# Patient Record
Sex: FEMALE | Race: ASIAN
[De-identification: names, ages, dates, MRNs, and addresses within clinical notes are randomized per-mention and may not be internally consistent; named-entity substitution may affect disease eponyms.]

---

## 2017-05-09 ENCOUNTER — APPOINTMENT (OUTPATIENT)
Dept: ANTEPARTUM | Facility: CLINIC | Age: 27
End: 2017-05-09

## 2017-05-30 ENCOUNTER — APPOINTMENT (OUTPATIENT)
Dept: ANTEPARTUM | Facility: CLINIC | Age: 27
End: 2017-05-30

## 2017-06-11 ENCOUNTER — EMERGENCY (EMERGENCY)
Facility: HOSPITAL | Age: 27
LOS: 0 days | Discharge: HOME | End: 2017-06-11

## 2017-06-11 DIAGNOSIS — O99.89 OTHER SPECIFIED DISEASES AND CONDITIONS COMPLICATING PREGNANCY, CHILDBIRTH AND THE PUERPERIUM: ICD-10-CM

## 2017-06-28 DIAGNOSIS — Z91.048 OTHER NONMEDICINAL SUBSTANCE ALLERGY STATUS: ICD-10-CM

## 2017-06-28 DIAGNOSIS — Z3A.14 14 WEEKS GESTATION OF PREGNANCY: ICD-10-CM

## 2017-06-28 DIAGNOSIS — R10.2 PELVIC AND PERINEAL PAIN: ICD-10-CM

## 2017-06-28 DIAGNOSIS — O23.42 UNSPECIFIED INFECTION OF URINARY TRACT IN PREGNANCY, SECOND TRIMESTER: ICD-10-CM

## 2017-06-28 DIAGNOSIS — R10.30 LOWER ABDOMINAL PAIN, UNSPECIFIED: ICD-10-CM

## 2017-06-28 DIAGNOSIS — Z91.013 ALLERGY TO SEAFOOD: ICD-10-CM

## 2017-06-28 DIAGNOSIS — O21.9 VOMITING OF PREGNANCY, UNSPECIFIED: ICD-10-CM

## 2017-07-12 ENCOUNTER — OUTPATIENT (OUTPATIENT)
Dept: ADMINISTRATIVE | Facility: HOSPITAL | Age: 27
LOS: 1 days | Discharge: HOME | End: 2017-07-12

## 2017-07-12 DIAGNOSIS — O99.89 OTHER SPECIFIED DISEASES AND CONDITIONS COMPLICATING PREGNANCY, CHILDBIRTH AND THE PUERPERIUM: ICD-10-CM

## 2017-07-25 ENCOUNTER — APPOINTMENT (OUTPATIENT)
Dept: ANTEPARTUM | Facility: CLINIC | Age: 27
End: 2017-07-25

## 2017-07-25 ENCOUNTER — OUTPATIENT (OUTPATIENT)
Dept: OUTPATIENT SERVICES | Facility: HOSPITAL | Age: 27
LOS: 1 days | Discharge: HOME | End: 2017-07-25

## 2017-07-25 DIAGNOSIS — O99.89 OTHER SPECIFIED DISEASES AND CONDITIONS COMPLICATING PREGNANCY, CHILDBIRTH AND THE PUERPERIUM: ICD-10-CM

## 2017-08-15 DIAGNOSIS — O26.892 OTHER SPECIFIED PREGNANCY RELATED CONDITIONS, SECOND TRIMESTER: ICD-10-CM

## 2017-08-15 DIAGNOSIS — O36.8190 DECREASED FETAL MOVEMENTS, UNSPECIFIED TRIMESTER, NOT APPLICABLE OR UNSPECIFIED: ICD-10-CM

## 2017-09-26 ENCOUNTER — OUTPATIENT (OUTPATIENT)
Dept: OUTPATIENT SERVICES | Facility: HOSPITAL | Age: 27
LOS: 1 days | Discharge: HOME | End: 2017-09-26

## 2017-09-26 DIAGNOSIS — O99.89 OTHER SPECIFIED DISEASES AND CONDITIONS COMPLICATING PREGNANCY, CHILDBIRTH AND THE PUERPERIUM: ICD-10-CM

## 2017-10-12 ENCOUNTER — OUTPATIENT (OUTPATIENT)
Dept: OUTPATIENT SERVICES | Facility: HOSPITAL | Age: 27
LOS: 1 days | Discharge: HOME | End: 2017-10-12

## 2017-10-12 DIAGNOSIS — Z34.90 ENCOUNTER FOR SUPERVISION OF NORMAL PREGNANCY, UNSPECIFIED, UNSPECIFIED TRIMESTER: ICD-10-CM

## 2017-10-12 DIAGNOSIS — O99.89 OTHER SPECIFIED DISEASES AND CONDITIONS COMPLICATING PREGNANCY, CHILDBIRTH AND THE PUERPERIUM: ICD-10-CM

## 2017-10-23 ENCOUNTER — APPOINTMENT (OUTPATIENT)
Dept: OBGYN | Facility: CLINIC | Age: 27
End: 2017-10-23

## 2017-10-23 ENCOUNTER — APPOINTMENT (OUTPATIENT)
Dept: ANTEPARTUM | Facility: CLINIC | Age: 27
End: 2017-10-23

## 2017-10-23 ENCOUNTER — RESULT REVIEW (OUTPATIENT)
Age: 27
End: 2017-10-23

## 2017-10-23 ENCOUNTER — OUTPATIENT (OUTPATIENT)
Dept: OUTPATIENT SERVICES | Facility: HOSPITAL | Age: 27
LOS: 1 days | Discharge: HOME | End: 2017-10-23

## 2017-10-23 VITALS — WEIGHT: 216.3 LBS | DIASTOLIC BLOOD PRESSURE: 62 MMHG | SYSTOLIC BLOOD PRESSURE: 119 MMHG

## 2017-10-23 VITALS — OXYGEN SATURATION: 99 % | HEART RATE: 83 BPM | TEMPERATURE: 97.3 F

## 2017-10-23 VITALS — HEIGHT: 60 IN | BODY MASS INDEX: 42.24 KG/M2

## 2017-10-23 DIAGNOSIS — O99.89 OTHER SPECIFIED DISEASES AND CONDITIONS COMPLICATING PREGNANCY, CHILDBIRTH AND THE PUERPERIUM: ICD-10-CM

## 2017-10-23 DIAGNOSIS — Z78.9 OTHER SPECIFIED HEALTH STATUS: ICD-10-CM

## 2017-10-23 DIAGNOSIS — Z83.3 FAMILY HISTORY OF DIABETES MELLITUS: ICD-10-CM

## 2017-10-23 LAB
BILIRUB UR QL STRIP: NEGATIVE
CLARITY UR: CLEAR
COLLECTION METHOD: NORMAL
GLUCOSE BLDC GLUCOMTR-MCNC: 89
GLUCOSE SERPL-MCNC: 89 MG/DL
GLUCOSE UR-MCNC: NEGATIVE
HCG UR QL: 0.2 EU/DL
HGB UR QL STRIP.AUTO: NEGATIVE
KETONES UR-MCNC: NEGATIVE
LEUKOCYTE ESTERASE UR QL STRIP: NORMAL
NITRITE UR QL STRIP: NEGATIVE
PH UR STRIP: 6.5
PROT UR STRIP-MCNC: NEGATIVE
SP GR UR STRIP: 1.02

## 2017-10-23 RX ORDER — LANCETS 33 GAUGE
EACH MISCELLANEOUS
Qty: 120 | Refills: 5 | Status: ACTIVE | COMMUNITY
Start: 2017-10-23 | End: 1900-01-01

## 2017-10-23 RX ORDER — ISOPROPYL ALCOHOL 70 ML/100ML
SWAB TOPICAL
Qty: 120 | Refills: 5 | Status: ACTIVE | COMMUNITY
Start: 2017-10-23 | End: 1900-01-01

## 2017-10-23 RX ORDER — PRENATAL VIT 49/IRON FUM/FOLIC 6.75-0.2MG
TABLET ORAL
Refills: 0 | Status: ACTIVE | COMMUNITY

## 2017-10-30 ENCOUNTER — APPOINTMENT (OUTPATIENT)
Dept: ANTEPARTUM | Facility: CLINIC | Age: 27
End: 2017-10-30

## 2017-10-30 ENCOUNTER — OUTPATIENT (OUTPATIENT)
Dept: OUTPATIENT SERVICES | Facility: HOSPITAL | Age: 27
LOS: 1 days | Discharge: HOME | End: 2017-10-30

## 2017-10-30 ENCOUNTER — RESULT REVIEW (OUTPATIENT)
Age: 27
End: 2017-10-30

## 2017-10-30 ENCOUNTER — APPOINTMENT (OUTPATIENT)
Dept: OBGYN | Facility: CLINIC | Age: 27
End: 2017-10-30

## 2017-10-30 VITALS
HEART RATE: 84 BPM | BODY MASS INDEX: 42.41 KG/M2 | TEMPERATURE: 97.5 F | HEIGHT: 60 IN | OXYGEN SATURATION: 98 % | DIASTOLIC BLOOD PRESSURE: 70 MMHG | SYSTOLIC BLOOD PRESSURE: 125 MMHG | WEIGHT: 216 LBS

## 2017-10-30 DIAGNOSIS — A53.0 LATENT SYPHILIS, UNSPECIFIED AS EARLY OR LATE: ICD-10-CM

## 2017-10-30 DIAGNOSIS — O99.89 OTHER SPECIFIED DISEASES AND CONDITIONS COMPLICATING PREGNANCY, CHILDBIRTH AND THE PUERPERIUM: ICD-10-CM

## 2017-10-30 LAB
BILIRUB UR QL STRIP: NEGATIVE
CLARITY UR: CLEAR
COLLECTION METHOD: NORMAL
GLUCOSE BLDC GLUCOMTR-MCNC: 84
GLUCOSE SERPL-MCNC: 84 MG/DL
GLUCOSE UR-MCNC: NEGATIVE
HCG UR QL: NORMAL EU/DL
HGB UR QL STRIP.AUTO: NEGATIVE
KETONES UR-MCNC: NEGATIVE
LEUKOCYTE ESTERASE UR QL STRIP: NEGATIVE
NITRITE UR QL STRIP: NEGATIVE
PH UR STRIP: NORMAL
PROT UR STRIP-MCNC: NEGATIVE
SP GR UR STRIP: 1

## 2017-10-30 RX ORDER — BLOOD SUGAR DIAGNOSTIC
STRIP MISCELLANEOUS 4 TIMES DAILY
Qty: 120 | Refills: 10 | Status: ACTIVE | COMMUNITY
Start: 2017-10-30 | End: 1900-01-01

## 2017-10-31 LAB
BASOPHILS # BLD: 0.01 TH/MM3
BASOPHILS NFR BLD: 0.1 %
DIFFERENTIAL METHOD BLD: NORMAL
EOSINOPHIL # BLD: 0.07 TH/MM3
EOSINOPHIL NFR BLD: 0.8 %
ERYTHROCYTE [DISTWIDTH] IN BLOOD BY AUTOMATED COUNT: 13.6 %
ESTIMATED AVERGAGE GLUCOSE (NORTH): 117 MG/DL
GRANULOCYTES # BLD: 6.15 TH/MM3
GRANULOCYTES NFR BLD: 68.3 %
HBA1C MFR BLD: 5.7 %
HCT VFR BLD AUTO: 39.7 %
HGB BLD-MCNC: 12.5 G/DL
IMM GRANULOCYTES # BLD: 0.05 TH/MM3
IMM GRANULOCYTES NFR BLD: 0.6 %
LYMPHOCYTES # BLD: 2.1 TH/MM3
LYMPHOCYTES NFR BLD: 23.4 %
MCH RBC QN AUTO: 28.7 PG
MCHC RBC AUTO-ENTMCNC: 31.5 G/DL
MCV RBC AUTO: 91.3 FL
MONOCYTES # BLD: 0.61 TH/MM3
MONOCYTES NFR BLD: 6.8 %
PLATELET # BLD: 402 TH/MM3
PMV BLD AUTO: 9.7 FL
RBC # BLD AUTO: 4.35 MIL/MM3
RPR SER QL: ABNORMAL
WBC # BLD: 8.99 TH/MM3

## 2017-11-01 LAB
HIV1+2 AB SPEC QL IA.RAPID: NONREACTIVE
T PALLIDUM AB SER QL: NEGATIVE
T PALLIDUM AB SER-ACNC: <0.1 INDEX

## 2017-11-06 ENCOUNTER — RESULT REVIEW (OUTPATIENT)
Age: 27
End: 2017-11-06

## 2017-11-06 ENCOUNTER — APPOINTMENT (OUTPATIENT)
Dept: OBGYN | Facility: CLINIC | Age: 27
End: 2017-11-06

## 2017-11-06 ENCOUNTER — RESULT CHARGE (OUTPATIENT)
Age: 27
End: 2017-11-06

## 2017-11-06 ENCOUNTER — OUTPATIENT (OUTPATIENT)
Dept: OUTPATIENT SERVICES | Facility: HOSPITAL | Age: 27
LOS: 1 days | Discharge: HOME | End: 2017-11-06

## 2017-11-06 ENCOUNTER — APPOINTMENT (OUTPATIENT)
Dept: ANTEPARTUM | Facility: CLINIC | Age: 27
End: 2017-11-06

## 2017-11-06 VITALS — DIASTOLIC BLOOD PRESSURE: 68 MMHG | WEIGHT: 211.2 LBS | BODY MASS INDEX: 41.25 KG/M2 | SYSTOLIC BLOOD PRESSURE: 125 MMHG

## 2017-11-06 VITALS — TEMPERATURE: 97.4 F | HEART RATE: 84 BPM | OXYGEN SATURATION: 97 %

## 2017-11-06 DIAGNOSIS — O99.89 OTHER SPECIFIED DISEASES AND CONDITIONS COMPLICATING PREGNANCY, CHILDBIRTH AND THE PUERPERIUM: ICD-10-CM

## 2017-11-06 LAB
BILIRUB UR QL STRIP: NORMAL
CLARITY UR: CLEAR
COLLECTION METHOD: NORMAL
GLUCOSE BLDC GLUCOMTR-MCNC: 86
GLUCOSE UR-MCNC: NEGATIVE
HCG UR QL: 0.2 EU/DL
HGB UR QL STRIP.AUTO: NEGATIVE
KETONES UR-MCNC: NEGATIVE
LEUKOCYTE ESTERASE UR QL STRIP: NEGATIVE
NITRITE UR QL STRIP: NEGATIVE
PH UR STRIP: 6
PROT UR STRIP-MCNC: NEGATIVE
SP GR UR STRIP: 1.01

## 2017-11-07 LAB — GLUCOSE SERPL-MCNC: 86 MG/DL

## 2017-11-13 ENCOUNTER — APPOINTMENT (OUTPATIENT)
Dept: ANTEPARTUM | Facility: CLINIC | Age: 27
End: 2017-11-13

## 2017-11-13 ENCOUNTER — OUTPATIENT (OUTPATIENT)
Dept: OUTPATIENT SERVICES | Facility: HOSPITAL | Age: 27
LOS: 1 days | Discharge: HOME | End: 2017-11-13

## 2017-11-13 ENCOUNTER — RESULT REVIEW (OUTPATIENT)
Age: 27
End: 2017-11-13

## 2017-11-13 VITALS — DIASTOLIC BLOOD PRESSURE: 68 MMHG | SYSTOLIC BLOOD PRESSURE: 118 MMHG | BODY MASS INDEX: 41.35 KG/M2 | WEIGHT: 211.7 LBS

## 2017-11-13 VITALS — OXYGEN SATURATION: 98 % | HEART RATE: 95 BPM | TEMPERATURE: 98.1 F

## 2017-11-13 DIAGNOSIS — O99.89 OTHER SPECIFIED DISEASES AND CONDITIONS COMPLICATING PREGNANCY, CHILDBIRTH AND THE PUERPERIUM: ICD-10-CM

## 2017-11-13 DIAGNOSIS — O24.415 GESTATIONAL DIABETES MELLITUS IN PREGNANCY, CONTROLLED BY ORAL HYPOGLYCEMIC DRUGS: ICD-10-CM

## 2017-11-13 DIAGNOSIS — Z34.90 ENCOUNTER FOR SUPERVISION OF NORMAL PREGNANCY, UNSPECIFIED, UNSPECIFIED TRIMESTER: ICD-10-CM

## 2017-11-13 DIAGNOSIS — O24.419 GESTATIONAL DIABETES MELLITUS IN PREGNANCY, UNSPECIFIED CONTROL: ICD-10-CM

## 2017-11-13 LAB
BILIRUB UR QL STRIP: NEGATIVE
CLARITY UR: CLEAR
COLLECTION METHOD: NORMAL
GLUCOSE BLDC GLUCOMTR-MCNC: 86
GLUCOSE SERPL-MCNC: 86 MG/DL
GLUCOSE UR-MCNC: NEGATIVE
HCG UR QL: 0.2 EU/DL
HGB UR QL STRIP.AUTO: NORMAL
KETONES UR-MCNC: NEGATIVE
LEUKOCYTE ESTERASE UR QL STRIP: NORMAL
NITRITE UR QL STRIP: NEGATIVE
PH UR STRIP: 6.5
PROT UR STRIP-MCNC: NEGATIVE
SP GR UR STRIP: 1.01

## 2017-11-13 RX ORDER — SODIUM CHLORIDE 0.65 %
0.65 AEROSOL, SPRAY (ML) NASAL TWICE DAILY
Qty: 1 | Refills: 2 | Status: ACTIVE | COMMUNITY
Start: 2017-11-13 | End: 1900-01-01

## 2017-11-14 RX ORDER — PNV NO.95/FERROUS FUM/FOLIC AC 28MG-0.8MG
TABLET ORAL DAILY
Qty: 30 | Refills: 6 | Status: ACTIVE | COMMUNITY
Start: 2017-11-14 | End: 1900-01-01

## 2017-11-16 LAB
C TRACH RRNA SPEC QL NAA+PROBE: NOT DETECTED
GP B STREP DNA SPEC QL NAA+PROBE: NORMAL
N GONORRHOEA RRNA SPEC QL NAA+PROBE: NOT DETECTED
S PYO DNA THROAT QL NAA+PROBE: NOT DETECTED
SPECIMEN SOURCE: NORMAL

## 2017-11-20 ENCOUNTER — RESULT CHARGE (OUTPATIENT)
Age: 27
End: 2017-11-20

## 2017-11-20 ENCOUNTER — APPOINTMENT (OUTPATIENT)
Dept: ANTEPARTUM | Facility: CLINIC | Age: 27
End: 2017-11-20

## 2017-11-20 ENCOUNTER — RESULT REVIEW (OUTPATIENT)
Age: 27
End: 2017-11-20

## 2017-11-20 ENCOUNTER — OUTPATIENT (OUTPATIENT)
Dept: OUTPATIENT SERVICES | Facility: HOSPITAL | Age: 27
LOS: 1 days | Discharge: HOME | End: 2017-11-20

## 2017-11-20 ENCOUNTER — EMERGENCY (EMERGENCY)
Facility: HOSPITAL | Age: 27
LOS: 0 days | Discharge: HOME | End: 2017-11-20

## 2017-11-20 VITALS
SYSTOLIC BLOOD PRESSURE: 124 MMHG | DIASTOLIC BLOOD PRESSURE: 70 MMHG | WEIGHT: 214.38 LBS | BODY MASS INDEX: 41.87 KG/M2

## 2017-11-20 VITALS — HEART RATE: 94 BPM | OXYGEN SATURATION: 99 % | TEMPERATURE: 97.5 F

## 2017-11-20 DIAGNOSIS — Z3A.36 36 WEEKS GESTATION OF PREGNANCY: ICD-10-CM

## 2017-11-20 DIAGNOSIS — O99.89 OTHER SPECIFIED DISEASES AND CONDITIONS COMPLICATING PREGNANCY, CHILDBIRTH AND THE PUERPERIUM: ICD-10-CM

## 2017-11-20 DIAGNOSIS — M79.604 PAIN IN RIGHT LEG: ICD-10-CM

## 2017-11-20 DIAGNOSIS — M25.40 EFFUSION, UNSPECIFIED JOINT: ICD-10-CM

## 2017-11-20 DIAGNOSIS — Z91.013 ALLERGY TO SEAFOOD: ICD-10-CM

## 2017-11-20 DIAGNOSIS — Z91.018 ALLERGY TO OTHER FOODS: ICD-10-CM

## 2017-11-20 LAB
BILIRUB UR QL STRIP: NEGATIVE
CLARITY UR: CLEAR
COLLECTION METHOD: NORMAL
GLUCOSE BLDC GLUCOMTR-MCNC: 99
GLUCOSE SERPL-MCNC: 99 MG/DL
GLUCOSE UR-MCNC: NEGATIVE
HCG UR QL: 0.2 EU/DL
HGB UR QL STRIP.AUTO: NEGATIVE
KETONES UR-MCNC: NEGATIVE
LEUKOCYTE ESTERASE UR QL STRIP: NEGATIVE
NITRITE UR QL STRIP: NEGATIVE
PH UR STRIP: 6
PROT UR STRIP-MCNC: NEGATIVE
SP GR UR STRIP: 1.01

## 2017-11-20 RX ORDER — GLYBURIDE 2.5 MG/1
2.5 TABLET ORAL DAILY
Qty: 30 | Refills: 5 | Status: ACTIVE | COMMUNITY
Start: 2017-10-23 | End: 1900-01-01

## 2017-11-25 ENCOUNTER — INPATIENT (INPATIENT)
Facility: HOSPITAL | Age: 27
LOS: 0 days | Discharge: HOME | End: 2017-11-26
Attending: OBSTETRICS & GYNECOLOGY | Admitting: OBSTETRICS & GYNECOLOGY

## 2017-11-25 DIAGNOSIS — O99.89 OTHER SPECIFIED DISEASES AND CONDITIONS COMPLICATING PREGNANCY, CHILDBIRTH AND THE PUERPERIUM: ICD-10-CM

## 2017-11-27 ENCOUNTER — APPOINTMENT (OUTPATIENT)
Dept: ANTEPARTUM | Facility: CLINIC | Age: 27
End: 2017-11-27

## 2017-11-29 DIAGNOSIS — K42.9 UMBILICAL HERNIA WITHOUT OBSTRUCTION OR GANGRENE: ICD-10-CM

## 2017-11-29 DIAGNOSIS — Z82.49 FAMILY HISTORY OF ISCHEMIC HEART DISEASE AND OTHER DISEASES OF THE CIRCULATORY SYSTEM: ICD-10-CM

## 2017-11-29 DIAGNOSIS — E66.9 OBESITY, UNSPECIFIED: ICD-10-CM

## 2017-11-29 DIAGNOSIS — Z33.1 PREGNANT STATE, INCIDENTAL: ICD-10-CM

## 2017-11-29 DIAGNOSIS — Z3A.37 37 WEEKS GESTATION OF PREGNANCY: ICD-10-CM

## 2017-11-29 DIAGNOSIS — Z83.3 FAMILY HISTORY OF DIABETES MELLITUS: ICD-10-CM

## 2017-12-02 ENCOUNTER — EMERGENCY (EMERGENCY)
Facility: HOSPITAL | Age: 27
LOS: 0 days | Discharge: HOME | End: 2017-12-02

## 2017-12-02 DIAGNOSIS — O99.89 OTHER SPECIFIED DISEASES AND CONDITIONS COMPLICATING PREGNANCY, CHILDBIRTH AND THE PUERPERIUM: ICD-10-CM

## 2017-12-02 DIAGNOSIS — R10.2 PELVIC AND PERINEAL PAIN: ICD-10-CM

## 2017-12-02 DIAGNOSIS — Z91.030 BEE ALLERGY STATUS: ICD-10-CM

## 2017-12-02 DIAGNOSIS — Z91.013 ALLERGY TO SEAFOOD: ICD-10-CM

## 2017-12-02 DIAGNOSIS — R05 COUGH: ICD-10-CM

## 2017-12-02 DIAGNOSIS — J06.9 ACUTE UPPER RESPIRATORY INFECTION, UNSPECIFIED: ICD-10-CM

## 2017-12-04 ENCOUNTER — APPOINTMENT (OUTPATIENT)
Dept: ANTEPARTUM | Facility: CLINIC | Age: 27
End: 2017-12-04

## 2017-12-11 ENCOUNTER — APPOINTMENT (OUTPATIENT)
Dept: ANTEPARTUM | Facility: CLINIC | Age: 27
End: 2017-12-11

## 2018-02-06 LAB — FETAL MOVEMENT: PRESENT

## 2018-07-25 PROBLEM — Z78.9 ALCOHOL USE: Status: INACTIVE | Noted: 2017-10-23

## 2019-02-03 ENCOUNTER — EMERGENCY (EMERGENCY)
Facility: HOSPITAL | Age: 29
LOS: 0 days | Discharge: HOME | End: 2019-02-03
Attending: EMERGENCY MEDICINE

## 2019-02-03 VITALS
RESPIRATION RATE: 18 BRPM | TEMPERATURE: 99 F | DIASTOLIC BLOOD PRESSURE: 68 MMHG | OXYGEN SATURATION: 96 % | HEART RATE: 91 BPM | SYSTOLIC BLOOD PRESSURE: 117 MMHG

## 2019-02-03 VITALS
HEART RATE: 112 BPM | RESPIRATION RATE: 18 BRPM | DIASTOLIC BLOOD PRESSURE: 77 MMHG | TEMPERATURE: 101 F | SYSTOLIC BLOOD PRESSURE: 133 MMHG | OXYGEN SATURATION: 99 %

## 2019-02-03 DIAGNOSIS — R50.9 FEVER, UNSPECIFIED: ICD-10-CM

## 2019-02-03 DIAGNOSIS — R07.0 PAIN IN THROAT: ICD-10-CM

## 2019-02-03 DIAGNOSIS — R05 COUGH: ICD-10-CM

## 2019-02-03 LAB
ALBUMIN SERPL ELPH-MCNC: 4.7 G/DL — SIGNIFICANT CHANGE UP (ref 3.5–5.2)
ALP SERPL-CCNC: 85 U/L — SIGNIFICANT CHANGE UP (ref 30–115)
ALT FLD-CCNC: 29 U/L — SIGNIFICANT CHANGE UP (ref 0–41)
ANION GAP SERPL CALC-SCNC: 16 MMOL/L — HIGH (ref 7–14)
APPEARANCE UR: CLEAR — SIGNIFICANT CHANGE UP
AST SERPL-CCNC: 23 U/L — SIGNIFICANT CHANGE UP (ref 0–41)
BASOPHILS # BLD AUTO: 0.02 K/UL — SIGNIFICANT CHANGE UP (ref 0–0.2)
BASOPHILS NFR BLD AUTO: 0.4 % — SIGNIFICANT CHANGE UP (ref 0–1)
BILIRUB SERPL-MCNC: 0.2 MG/DL — SIGNIFICANT CHANGE UP (ref 0.2–1.2)
BILIRUB UR-MCNC: NEGATIVE — SIGNIFICANT CHANGE UP
BUN SERPL-MCNC: 7 MG/DL — LOW (ref 10–20)
CALCIUM SERPL-MCNC: 8.8 MG/DL — SIGNIFICANT CHANGE UP (ref 8.5–10.1)
CHLORIDE SERPL-SCNC: 98 MMOL/L — SIGNIFICANT CHANGE UP (ref 98–110)
CO2 SERPL-SCNC: 24 MMOL/L — SIGNIFICANT CHANGE UP (ref 17–32)
COLOR SPEC: YELLOW — SIGNIFICANT CHANGE UP
CREAT SERPL-MCNC: 0.8 MG/DL — SIGNIFICANT CHANGE UP (ref 0.7–1.5)
DIFF PNL FLD: NEGATIVE — SIGNIFICANT CHANGE UP
EOSINOPHIL # BLD AUTO: 0.02 K/UL — SIGNIFICANT CHANGE UP (ref 0–0.7)
EOSINOPHIL NFR BLD AUTO: 0.4 % — SIGNIFICANT CHANGE UP (ref 0–8)
GLUCOSE SERPL-MCNC: 68 MG/DL — LOW (ref 70–99)
GLUCOSE UR QL: NEGATIVE MG/DL — SIGNIFICANT CHANGE UP
HCG SERPL QL: NEGATIVE — SIGNIFICANT CHANGE UP
HCT VFR BLD CALC: 39.3 % — SIGNIFICANT CHANGE UP (ref 37–47)
HGB BLD-MCNC: 12.8 G/DL — SIGNIFICANT CHANGE UP (ref 12–16)
IMM GRANULOCYTES NFR BLD AUTO: 0.4 % — HIGH (ref 0.1–0.3)
KETONES UR-MCNC: NEGATIVE — SIGNIFICANT CHANGE UP
LACTATE SERPL-SCNC: 1 MMOL/L — SIGNIFICANT CHANGE UP (ref 0.5–2.2)
LEUKOCYTE ESTERASE UR-ACNC: NEGATIVE — SIGNIFICANT CHANGE UP
LYMPHOCYTES # BLD AUTO: 1.96 K/UL — SIGNIFICANT CHANGE UP (ref 1.2–3.4)
LYMPHOCYTES # BLD AUTO: 39 % — SIGNIFICANT CHANGE UP (ref 20.5–51.1)
MCHC RBC-ENTMCNC: 28.5 PG — SIGNIFICANT CHANGE UP (ref 27–31)
MCHC RBC-ENTMCNC: 32.6 G/DL — SIGNIFICANT CHANGE UP (ref 32–37)
MCV RBC AUTO: 87.5 FL — SIGNIFICANT CHANGE UP (ref 81–99)
MONOCYTES # BLD AUTO: 0.58 K/UL — SIGNIFICANT CHANGE UP (ref 0.1–0.6)
MONOCYTES NFR BLD AUTO: 11.6 % — HIGH (ref 1.7–9.3)
NEUTROPHILS # BLD AUTO: 2.42 K/UL — SIGNIFICANT CHANGE UP (ref 1.4–6.5)
NEUTROPHILS NFR BLD AUTO: 48.2 % — SIGNIFICANT CHANGE UP (ref 42.2–75.2)
NITRITE UR-MCNC: NEGATIVE — SIGNIFICANT CHANGE UP
NRBC # BLD: 0 /100 WBCS — SIGNIFICANT CHANGE UP (ref 0–0)
PH UR: 7 — SIGNIFICANT CHANGE UP (ref 5–8)
PLATELET # BLD AUTO: 297 K/UL — SIGNIFICANT CHANGE UP (ref 130–400)
POTASSIUM SERPL-MCNC: 4.3 MMOL/L — SIGNIFICANT CHANGE UP (ref 3.5–5)
POTASSIUM SERPL-SCNC: 4.3 MMOL/L — SIGNIFICANT CHANGE UP (ref 3.5–5)
PROT SERPL-MCNC: 7.4 G/DL — SIGNIFICANT CHANGE UP (ref 6–8)
PROT UR-MCNC: NEGATIVE MG/DL — SIGNIFICANT CHANGE UP
RBC # BLD: 4.49 M/UL — SIGNIFICANT CHANGE UP (ref 4.2–5.4)
RBC # FLD: 12.1 % — SIGNIFICANT CHANGE UP (ref 11.5–14.5)
SODIUM SERPL-SCNC: 138 MMOL/L — SIGNIFICANT CHANGE UP (ref 135–146)
SP GR SPEC: <=1.005 — SIGNIFICANT CHANGE UP (ref 1.01–1.03)
UROBILINOGEN FLD QL: 0.2 MG/DL — SIGNIFICANT CHANGE UP (ref 0.2–0.2)
WBC # BLD: 5.02 K/UL — SIGNIFICANT CHANGE UP (ref 4.8–10.8)
WBC # FLD AUTO: 5.02 K/UL — SIGNIFICANT CHANGE UP (ref 4.8–10.8)

## 2019-02-03 RX ORDER — KETOROLAC TROMETHAMINE 30 MG/ML
30 SYRINGE (ML) INJECTION ONCE
Qty: 0 | Refills: 0 | Status: DISCONTINUED | OUTPATIENT
Start: 2019-02-03 | End: 2019-02-03

## 2019-02-03 RX ORDER — DEXAMETHASONE 0.5 MG/5ML
10 ELIXIR ORAL ONCE
Qty: 0 | Refills: 0 | Status: COMPLETED | OUTPATIENT
Start: 2019-02-03 | End: 2019-02-03

## 2019-02-03 RX ORDER — SODIUM CHLORIDE 9 MG/ML
2000 INJECTION, SOLUTION INTRAVENOUS ONCE
Qty: 0 | Refills: 0 | Status: COMPLETED | OUTPATIENT
Start: 2019-02-03 | End: 2019-02-03

## 2019-02-03 RX ADMIN — Medication 30 MILLIGRAM(S): at 12:01

## 2019-02-03 RX ADMIN — Medication 30 MILLIGRAM(S): at 13:19

## 2019-02-03 RX ADMIN — SODIUM CHLORIDE 2000 MILLILITER(S): 9 INJECTION, SOLUTION INTRAVENOUS at 13:17

## 2019-02-03 RX ADMIN — Medication 10 MILLIGRAM(S): at 12:01

## 2019-02-03 NOTE — ED PROVIDER NOTE - MEDICAL DECISION MAKING DETAILS
fever, cough, throat pain - labs/cxr/CT soft tissue neck nl - ivf & anti[yretics given, pt felt better prior to dischsrge, return precautions discussed, encouraged outpt PMD f/u

## 2019-02-03 NOTE — ED PROVIDER NOTE - PHYSICAL EXAMINATION
VITAL SIGNS: I have reviewed nursing notes and confirm.  CONSTITUTIONAL: Well-developed; well-nourished; in no acute distress.  SKIN: Skin exam is warm and dry, no acute rash.  HEAD: Normocephalic; atraumatic.  EYES: PERRL, EOM intact; +conjunctival injection bl, no sclera clear.  ENT: No nasal discharge; op clear, pharynx nl, no tonsillar erythema or edema, no exudates, uvula midline, no stridor/drooling.  NECK: Supple; non tender. No lad.  CARD: S1, S2 normal; no murmurs, gallops, or rubs. Regular rate and rhythm.  RESP: No wheezes, rales or rhonchi.  ABD: Normal bowel sounds; soft; non-distended; non-tender; no hepatosplenomegaly.  BACK: non-tender, no CVAT  EXT: Normal ROM. No clubbing, cyanosis or edema. DPI.  NEURO: Alert, oriented. Grossly unremarkable. No focal deficits.  PSYCH: Cooperative, appropriate.

## 2019-02-03 NOTE — ED ADULT NURSE NOTE - NSIMPLEMENTINTERV_GEN_ALL_ED
Implemented All Fall with Harm Risk Interventions:  [place] to call system. Call bell, personal items and telephone within reach. Instruct patient to call for assistance. Room bathroom lighting operational. Non-slip footwear when patient is off stretcher. Physically safe environment: no spills, clutter or unnecessary equipment. Stretcher in lowest position, wheels locked, appropriate side rails in place. Provide visual cue, wrist band, yellow gown, etc. Monitor gait and stability. Monitor for mental status changes and reorient to person, place, and time. Review medications for side effects contributing to fall risk. Reinforce activity limits and safety measures with patient and family. Provide visual clues: red socks.

## 2019-02-03 NOTE — ED ADULT NURSE REASSESSMENT NOTE - NS ED NURSE REASSESS COMMENT FT1
Pt reassessed A/O times 4 Vs stable report filling slight better ambulate steady lunch tray provide did eat 100% denies nausea or vomiting on going nursing observation .

## 2019-02-03 NOTE — ED PROVIDER NOTE - OBJECTIVE STATEMENT
28y f no pmh p/w fever & cough x 1 week. Also rpts "throat inf" appx 2 mos ago, was given an abx but doesn't remember name. States she now has throat pain again, feels like "something's stuck". Denies any diff swallowing or breathing. No cp/sob, nvd, flank pain, urinary sx, rash. +Sick contact,  w/similar sx. No recent travel.

## 2019-02-03 NOTE — ED PROVIDER NOTE - NS ED ROS FT
Constitutional: see hpi  Eyes:  No visual changes, eye pain or discharge.  ENMT:  see hpi  Cardiac:  No chest pain, SOB or edema. No chest pain with exertion.  Respiratory:  +cough or respiratory distress. No hemoptysis. No history of asthma or RAD.  GI:  No nausea, vomiting, diarrhea or abdominal pain.  :  No dysuria, frequency or burning.  MS:  No myalgia, muscle weakness, joint pain or back pain.  Neuro:  No headache or weakness.  No LOC.  Skin:  No skin rash.   Endocrine: No history of thyroid disease or diabetes.

## 2019-02-03 NOTE — ED PROVIDER NOTE - NSFOLLOWUPINSTRUCTIONS_ED_ALL_ED_FT
Viral Respiratory Infection    A viral respiratory infection is an illness that affects parts of the body used for breathing, like the lungs, nose, and throat. It is caused by a germ called a virus. Symptoms can include runny nose, coughing, sneezing, fatigue, body aches, sore throat, fever, or headache. Over the counter medicine can be used to manage the symptoms but the infection itself goes away on its own.     SEEK IMMEDIATE MEDICAL CARE IF YOU HAVE THE FOLLOWING SYMPTOMS: shortness of breath, chest pain, fever over 10 days, lightheadedness/dizziness.

## 2019-02-04 LAB
CULTURE RESULTS: SIGNIFICANT CHANGE UP
SPECIMEN SOURCE: SIGNIFICANT CHANGE UP

## 2019-04-18 ENCOUNTER — APPOINTMENT (OUTPATIENT)
Dept: ENDOCRINOLOGY | Facility: CLINIC | Age: 29
End: 2019-04-18

## 2020-01-12 ENCOUNTER — EMERGENCY (EMERGENCY)
Facility: HOSPITAL | Age: 30
LOS: 0 days | Discharge: HOME | End: 2020-01-12
Admitting: EMERGENCY MEDICINE
Payer: MEDICAID

## 2020-01-12 VITALS
RESPIRATION RATE: 20 BRPM | TEMPERATURE: 99 F | DIASTOLIC BLOOD PRESSURE: 60 MMHG | OXYGEN SATURATION: 95 % | SYSTOLIC BLOOD PRESSURE: 122 MMHG | HEART RATE: 87 BPM

## 2020-01-12 DIAGNOSIS — H57.89 OTHER SPECIFIED DISORDERS OF EYE AND ADNEXA: ICD-10-CM

## 2020-01-12 PROCEDURE — 99283 EMERGENCY DEPT VISIT LOW MDM: CPT

## 2020-01-12 RX ORDER — MOXIFLOXACIN HCL 0.5 %
1 DROPS OPHTHALMIC (EYE)
Qty: 3 | Refills: 0
Start: 2020-01-12 | End: 2020-01-18

## 2020-01-12 RX ORDER — OLOPATADINE HYDROCHLORIDE 1 MG/ML
1 SOLUTION/ DROPS OPHTHALMIC
Qty: 5 | Refills: 0
Start: 2020-01-12 | End: 2020-01-18

## 2020-01-12 RX ORDER — KETOTIFEN FUMARATE 0.34 MG/ML
1 SOLUTION OPHTHALMIC ONCE
Refills: 0 | Status: DISCONTINUED | OUTPATIENT
Start: 2020-01-12 | End: 2020-01-12

## 2020-01-12 RX ORDER — LORATADINE 10 MG/1
1 TABLET ORAL
Qty: 14 | Refills: 0
Start: 2020-01-12 | End: 2020-01-25

## 2020-01-12 NOTE — ED PROVIDER NOTE - NSFOLLOWUPINSTRUCTIONS_ED_ALL_ED_FT
CONJUNCTIVITIS - AfterCare(R) Instructions(ER/ED)     Conjunctivitis    WHAT YOU NEED TO KNOW:    Conjunctivitis, or pink eye, is inflammation of your conjunctiva. The conjunctiva is a thin tissue that covers the front of your eye and the back of your eyelids. The conjunctiva helps protect your eye and keep it moist. Conjunctivitis may be caused by bacteria, allergies, or a virus. If your conjunctivitis is caused by bacteria, it may get better on its own in about 7 days. Viral conjunctivitis can last up to 3 weeks.     DISCHARGE INSTRUCTIONS:    Return to the emergency department if:     You have worsening eye pain.       The swelling in your eye gets worse, even after treatment.       Your vision suddenly becomes worse or you cannot see at all.    Contact your healthcare provider if:     You develop a fever and ear pain.      You have tiny bumps or spots of blood on your eye.      You have questions or concerns about your condition or care.    Manage your symptoms:     Apply a cool compress. Wet a washcloth with cold water and place it on your eye. This will help decrease itching and irritation.      Do not wear contact lenses. They can irritate your eye. Throw away the pair you are using and ask when you can wear them again. Use a new pair of lenses when your healthcare provider says it is okay.       Avoid irritants. Stay away from smoke filled areas. Shield your eyes from wind and sun.       Flush your eye. You may need to flush your eye with saline to help decrease your symptoms. Ask for more information on how to flush your eye.     Medicines: Treatment depends on what is causing your conjunctivitis. You maybe given any of the following:    Allergy medicine helps decrease itchy, red, swollen eyes caused by allergies. It may be given as a pill, eye drops, or nasal spray.      Antibiotics may be needed if your conjunctivitis is caused by bacteria. This medicine may be given as a pill, eye drops, or eye ointment.      Take your medicine as directed. Contact your healthcare provider if you think your medicine is not helping or if you have side effects. Tell him or her if you are allergic to any medicine. Keep a list of the medicines, vitamins, and herbs you take. Include the amounts, and when and why you take them. Bring the list or the pill bottles to follow-up visits. Carry your medicine list with you in case of an emergency.    Prevent the spread of conjunctivitis:     Wash your hands with soap and water often. Wash your hands before and after you touch your eyes. Also wash your hands before you prepare or eat food and after you use the bathroom or change a diaper.      Avoid allergens. Try to avoid the things that cause your allergies, such as pets, dust, or grass.       Avoid contact with others. Do not share towels or washcloths. Try to stay away from others as much as possible. Ask when you can return to work or school.       Throw away eye makeup. The bacteria that caused your conjunctivitis can stay in eye makeup. Throw away mascara and other eye makeup.         © Copyright [employer] 2020       back to top         Follow up with your primary medical doctor in 1-2 days  Follow up with eye clinic tomorrow.

## 2020-01-12 NOTE — ED PROVIDER NOTE - OBJECTIVE STATEMENT
29 y.o female w/ hx of hypothyroid presents to the ED for evaluation of L eye redness x 1 week. For past week erythema and itchiness.  Went to PMD 5 days ago, prescribed polymixin drops.  Minimal relief with drops prompting visit to the ED.  Denies eye pain, changes in vision, crusting of eyelids, ear pain, fever, chills. No further complaints.  No known trauma or injury.

## 2020-01-12 NOTE — ED ADULT TRIAGE NOTE - CHIEF COMPLAINT QUOTE
Patient c/o bilateral eye redness x 1 week. Was started on eye drops by PMD but symptoms have not improved.

## 2020-01-12 NOTE — ED PROVIDER NOTE - CLINICAL SUMMARY MEDICAL DECISION MAKING FREE TEXT BOX
erythematous eye, likely allergic, meds prescribed.  f/u with eye clinic tomorrow. I have discussed the discharge plan with the patient. The patient agrees with the plan, as discussed.  The patient understands Emergency Department diagnosis is a preliminary diagnosis often based on limited information and that the patient must adhere to the follow-up plan as discussed.  The patient understands that if the symptoms worsen or if prescribed medications do not have the desired/planned effect that the patient may return to the Emergency Department at any time for further evaluation and treatment.

## 2020-01-12 NOTE — ED PROVIDER NOTE - PHYSICAL EXAMINATION
CONST: Well appearing in NAD  EYES: PERRL, EOMI, conjunctival injection R eye, R eye tearing, no fluorescin uptake, IOP 16 bilaterally, visual acuity 20/20 bilaterally   SKIN: Warm, dry, no acute rashes. Good turgor

## 2020-01-12 NOTE — ED PROVIDER NOTE - PROGRESS NOTE DETAILS
Discussed results with pt.  All questions were answered and return precautions discussed.  Pt is asx and comfortable at this time.  Unremarkable re-exam.  No further concerns at this time from pt.  Will follow up with PMD and eye clinic.  Pt understands and agrees with tx plan.

## 2020-01-12 NOTE — ED PROVIDER NOTE - NS ED ROS FT
CONST: No fever, chills or bodyaches  EYES: + eye redness. No pain,  drainage or visual changes.  ENT: No ear pain or discharge, nasal discharge or congestion. No sore throat  CARD: No chest pain, palpitations  RESP: No SOB, cough,   GI: No abdominal pain, N/V/D  MS: No joint pain, back pain or extremity pain/injury  SKIN: No rashes  NEURO: No headache, dizziness, paresthesias

## 2020-01-12 NOTE — ED PROVIDER NOTE - PATIENT PORTAL LINK FT
You can access the FollowMyHealth Patient Portal offered by  by registering at the following website: [email or web address]. By joining [employer]’s FollowMyHealth portal, you will also be able to view your health information using other applications (apps) compatible with our system.

## 2021-04-06 ENCOUNTER — OUTPATIENT (OUTPATIENT)
Dept: OUTPATIENT SERVICES | Facility: HOSPITAL | Age: 31
LOS: 1 days | Discharge: HOME | End: 2021-04-06

## 2021-04-06 DIAGNOSIS — Z12.31 ENCOUNTER FOR SCREENING MAMMOGRAM FOR MALIGNANT NEOPLASM OF BREAST: ICD-10-CM

## 2021-04-06 PROBLEM — E03.9 HYPOTHYROIDISM, UNSPECIFIED: Chronic | Status: ACTIVE | Noted: 2020-01-12

## 2021-12-13 NOTE — ED PROVIDER NOTE - NSFOLLOWUPCLINICS_GEN_ALL_ED_FT
[facility]  Ophthalmolgy  [address], [address]  [place] [address]  Phone: [phone]  Fax:   Follow Up Time: 1-3 Days Not applicable

## 2022-04-06 ENCOUNTER — EMERGENCY (EMERGENCY)
Facility: HOSPITAL | Age: 32
LOS: 0 days | Discharge: HOME | End: 2022-04-06
Attending: EMERGENCY MEDICINE | Admitting: EMERGENCY MEDICINE
Payer: MEDICAID

## 2022-04-06 VITALS
OXYGEN SATURATION: 99 % | HEART RATE: 81 BPM | HEIGHT: 60 IN | TEMPERATURE: 96 F | RESPIRATION RATE: 18 BRPM | DIASTOLIC BLOOD PRESSURE: 74 MMHG | SYSTOLIC BLOOD PRESSURE: 119 MMHG

## 2022-04-06 VITALS
DIASTOLIC BLOOD PRESSURE: 77 MMHG | HEIGHT: 60 IN | HEART RATE: 84 BPM | SYSTOLIC BLOOD PRESSURE: 132 MMHG | TEMPERATURE: 98 F | OXYGEN SATURATION: 98 % | RESPIRATION RATE: 20 BRPM | WEIGHT: 190.04 LBS

## 2022-04-06 DIAGNOSIS — E03.9 HYPOTHYROIDISM, UNSPECIFIED: ICD-10-CM

## 2022-04-06 DIAGNOSIS — L29.2 PRURITUS VULVAE: ICD-10-CM

## 2022-04-06 DIAGNOSIS — B37.3 CANDIDIASIS OF VULVA AND VAGINA: ICD-10-CM

## 2022-04-06 DIAGNOSIS — R30.0 DYSURIA: ICD-10-CM

## 2022-04-06 LAB
APPEARANCE UR: ABNORMAL
BACTERIA # UR AUTO: ABNORMAL
BILIRUB UR-MCNC: NEGATIVE — SIGNIFICANT CHANGE UP
COLOR SPEC: SIGNIFICANT CHANGE UP
DIFF PNL FLD: NEGATIVE — SIGNIFICANT CHANGE UP
EPI CELLS # UR: 5 /HPF — SIGNIFICANT CHANGE UP (ref 0–5)
GLUCOSE UR QL: NEGATIVE — SIGNIFICANT CHANGE UP
HYALINE CASTS # UR AUTO: 3 /LPF — SIGNIFICANT CHANGE UP (ref 0–7)
KETONES UR-MCNC: NEGATIVE — SIGNIFICANT CHANGE UP
LEUKOCYTE ESTERASE UR-ACNC: ABNORMAL
NITRITE UR-MCNC: NEGATIVE — SIGNIFICANT CHANGE UP
PH UR: 6.5 — SIGNIFICANT CHANGE UP (ref 5–8)
PROT UR-MCNC: SIGNIFICANT CHANGE UP
RBC CASTS # UR COMP ASSIST: 0 /HPF — SIGNIFICANT CHANGE UP (ref 0–4)
SP GR SPEC: 1.01 — SIGNIFICANT CHANGE UP (ref 1.01–1.03)
UROBILINOGEN FLD QL: SIGNIFICANT CHANGE UP
WBC UR QL: 23 /HPF — HIGH (ref 0–5)

## 2022-04-06 PROCEDURE — 99283 EMERGENCY DEPT VISIT LOW MDM: CPT

## 2022-04-06 PROCEDURE — 99284 EMERGENCY DEPT VISIT MOD MDM: CPT

## 2022-04-06 RX ORDER — FLUCONAZOLE 150 MG/1
1 TABLET ORAL
Qty: 1 | Refills: 0
Start: 2022-04-06 | End: 2022-04-06

## 2022-04-06 RX ORDER — FLUCONAZOLE 150 MG/1
150 TABLET ORAL ONCE
Refills: 0 | Status: COMPLETED | OUTPATIENT
Start: 2022-04-06 | End: 2022-04-06

## 2022-04-06 RX ORDER — MICONAZOLE NITRATE 2 %
1 CREAM (GRAM) TOPICAL
Qty: 7 | Refills: 0
Start: 2022-04-06 | End: 2022-04-12

## 2022-04-06 RX ADMIN — FLUCONAZOLE 150 MILLIGRAM(S): 150 TABLET ORAL at 10:35

## 2022-04-06 NOTE — ED PROVIDER NOTE - NSFOLLOWUPCLINICS_GEN_ALL_ED_FT
[facility]  OB/GYN  [address] [place] [address]  Phone: [phone]  Fax:

## 2022-04-06 NOTE — ED PROVIDER NOTE - NSFOLLOWUPINSTRUCTIONS_ED_ALL_ED_FT
Vaginitis  ImageVaginitis is a condition in which the vaginal tissue swells and becomes red (inflamed). This condition is most often caused by a change in the normal balance of bacteria and yeast that live in the vagina. This change causes an overgrowth of certain bacteria or yeast, which causes the inflammation. There are different types of vaginitis, but the most common types are:    Bacterial vaginosis.   Yeast infection (candidiasis).  Trichomoniasis vaginitis. This is a sexually transmitted disease (STD).  Viral vaginitis.  Atrophic vaginitis.  Allergic vaginitis.    What are the causes?  The cause of this condition depends on the type of vaginitis. It can be caused by:    Bacteria (bacterial vaginosis).  Yeast, which is a fungus (yeast infection).  A parasite (trichomoniasis vaginitis).  A virus (viral vaginitis).  Low hormone levels (atrophic vaginitis). Low hormone levels can occur during pregnancy, breastfeeding, or after menopause.  Irritants, such as bubble baths, scented tampons, and feminine sprays (allergic vaginitis).    Other factors can change the normal balance of the yeast and bacteria that live in the vagina. These include:    Antibiotic medicines.  Poor hygiene.  Diaphragms, vaginal sponges, spermicides, birth control pills, and intrauterine devices (IUD).  Sex.  Infection.  Uncontrolled diabetes.  A weakened defense (immune) system.    What increases the risk?  This condition is more likely to develop in women who:    Smoke.  Use vaginal douches, scented tampons, or scented sanitary pads.  Wear tight-fitting pants.  Wear thong underwear.  Use oral birth control pills or an IUD.  Have sex without a condom.  Have multiple sex partners.  Have an STD.  Frequently use the spermicide nonoxynol-9.  Eat lots of foods high in sugar.  Have uncontrolled diabetes.  Have low estrogen levels.  Have a weakened immune system from an immune disorder or medical treatment.  Are pregnant or breastfeeding.    What are the signs or symptoms?  Symptoms vary depending on the cause of the vaginitis. Common symptoms include:    Abnormal vaginal discharge.    The discharge is white, gray, or yellow with bacterial vaginosis.  The discharge is thick, white, and cheesy with a yeast infection.  The discharge is frothy and yellow or greenish with trichomoniasis.    A bad vaginal smell. The smell is fishy with bacterial vaginosis.  Vaginal itching, pain, or swelling.  Sex that is painful.  Pain or burning when urinating.    Sometimes there are no symptoms.    How is this diagnosed?  This condition is diagnosed based on your symptoms and medical history. A physical exam, including a pelvic exam, will also be done. You may also have other tests, including:    Tests to determine the pH level (acidity or alkalinity) of your vagina.  A whiff test, to assess the odor that results when a sample of your vaginal discharge is mixed with a potassium hydroxide solution.  Tests of vaginal fluid. A sample will be examined under a microscope.    How is this treated?  Treatment varies depending on the type of vaginitis you have. Your treatment may include:    Antibiotic creams or pills to treat bacterial vaginosis and trichomoniasis.  Antifungal medicines, such as vaginal creams or suppositories, to treat a yeast infection.  Medicine to ease discomfort if you have viral vaginitis. Your sexual partner should also be treated.  Estrogen delivered in a cream, pill, suppository, or vaginal ring to treat atrophic vaginitis. If vaginal dryness occurs, lubricants and moisturizing creams may help. You may need to avoid scented soaps, sprays, or douches.  Stopping use of a product that is causing allergic vaginitis. Then using a vaginal cream to treat the symptoms.    Follow these instructions at home:  Lifestyle     Keep your genital area clean and dry. Avoid soap, and only rinse the area with water.  Do not douche or use tampons until your health care provider says it is okay to do so. Use sanitary pads, if needed.  Do not have sex until your health care provider approves. When you can return to sex, practice safe sex and use condoms.  Wipe from front to back. This avoids the spread of bacteria from the rectum to the vagina.  General instructions     Take over-the-counter and prescription medicines only as told by your health care provider.  If you were prescribed an antibiotic medicine, take or use it as told by your health care provider. Do not stop taking or using the antibiotic even if you start to feel better.  Keep all follow-up visits as told by your health care provider. This is important.  How is this prevented?  Use mild, non-scented products. Do not use things that can irritate the vagina, such as fabric softeners. Avoid the following products if they are scented:    Feminine sprays.  Detergents.  Tampons.  Feminine hygiene products.  Soaps or bubble baths.    Let air reach your genital area.    Wear cotton underwear to reduce moisture buildup.  Avoid wearing underwear while you sleep.  Avoid wearing tight pants and underwear or nylons without a cotton panel.  Avoid wearing thong underwear.    Take off any wet clothing, such as bathing suits, as soon as possible.  Practice safe sex and use condoms.  Contact a health care provider if:  You have abdominal pain.  You have a fever.  You have symptoms that last for more than 2–3 days.  Get help right away if:  You have a fever and your symptoms suddenly get worse.  Summary  Vaginitis is a condition in which the vaginal tissue becomes inflamed.This condition is most often caused by a change in the normal balance of bacteria and yeast that live in the vagina.  Treatment varies depending on the type of vaginitis you have.  Do not douche, use tampons , or have sex until your health care provider approves. When you can return to sex, practice safe sex and use condoms.

## 2022-04-06 NOTE — ED PROVIDER NOTE - OBJECTIVE STATEMENT
pt seen earlier for yeast vaginitis, but did not take meds until pta 2/2 fasting. no relief after taking them, came for re-eval

## 2022-04-06 NOTE — ED PROVIDER NOTE - CLINICAL SUMMARY MEDICAL DECISION MAKING FREE TEXT BOX
31-year-old female with history of hypothyroidism, nondiabetic, presents to the ED for assessment of vaginal burning and discharge.  The patient was seen earlier and diagnosed with vulvovaginal candidiasis, she was prescribed fluconazole and was already using topical Monistat.  Patient notes that she did not take the fluconazole on discharge because she has been fasting.  She took it this evening notes that her burning and itching is worse.  No dysuria, hematuria, abdominal pain, fever, chills.    Vital signs normal.  The patient is well-appearing, in no distress.  She has clear lungs, regular rate and rhythm, soft, nontender, nondistended abdomen.  She has a moderate amount of yeastlike discharge adherent to the labia minora and inside the vaginal vault.  No discharge.  She has no other lesions.    Patient is being optimally treated.  She had a urinalysis earlier today which was slightly contaminated showing few WBC but no bacteria.  Urine culture is pending, will hold off on starting antibiotics as this will likely worsen her current candidiasis and UA is equivocal, culture was sent.  UA showed no glucose.    Advised on using cool compresses, GYN follow-up.    The patient notes that she has had recurrent candidiasis but has not discussed this with her GYN.  I advised on hygiene, changing the kind of soap she uses, cotton undergarments, need to follow-up with GYN to figure out why she is having recurrent infections.

## 2022-04-06 NOTE — ED ADULT TRIAGE NOTE - CHIEF COMPLAINT QUOTE
I was here this morning, theys say I have a fungal infection, I took the medicine 30 or 40 minutes ago because I was fasting all day, now its really burning and itching too much - patient

## 2022-04-06 NOTE — ED PROVIDER NOTE - NS ED ATTENDING STATEMENT MOD
This was a shared visit with the CHITRA. I reviewed and verified the documentation and independently performed the documented:

## 2022-04-06 NOTE — ED PROVIDER NOTE - PROGRESS NOTE DETAILS
ua noted from earlier today, will hold abx 2/2 yeast vaginitis. Pt voices understanding of use of medications, instructions for care, and reasons to return or to go to ED. Pt not concerned for STDs at this time

## 2022-04-06 NOTE — ED PROVIDER NOTE - CLINICAL SUMMARY MEDICAL DECISION MAKING FREE TEXT BOX
Pt with vaginal burning and itching, with yeast infection on exam.  will start antifungal, f/u with gyn outpt

## 2022-04-06 NOTE — ED PROVIDER NOTE - OBJECTIVE STATEMENT
32 yo f with pmh of hypothyroidism, presents with c/o vaginal itching and burning.  also with dysuria.  no hematuria.  no vaginal bleeding or discharge.  denies fever or chills.  pt says LMP 3/6, she is concerned she is pregnant.  gyn is on [facility].  no abd pain, no n/v/d.

## 2022-04-06 NOTE — ED ADULT NURSE NOTE - NSIMPLEMENTINTERV_GEN_ALL_ED
Implemented All Universal Safety Interventions:  [place] to call system. Call bell, personal items and telephone within reach. Instruct patient to call for assistance. Room bathroom lighting operational. Non-slip footwear when patient is off stretcher. Physically safe environment: no spills, clutter or unnecessary equipment. Stretcher in lowest position, wheels locked, appropriate side rails in place.

## 2022-04-06 NOTE — ED PROVIDER NOTE - NS ED ROS FT
Review of Systems:  	•	CONSTITUTIONAL - no fever, no diaphoresis, no weight change  	•	SKIN - no rash  	•	GI - no abd pain, no nausea, no vomiting, no diarrhea, no constipation, no bleeding  	•	 - + dysuria, no hematuria, no flank pain, no urinary retention, +vaginal itching  	•	MUSCULOSKELETAL - no joint paint, no swelling, no redness  All other systems negative, unless specified in HPI

## 2022-04-06 NOTE — ED ADULT NURSE NOTE - CHIEF COMPLAINT
The patient is a 48y Female complaining of fall. The patient is a 31y Female complaining of vaginal itching.

## 2022-04-06 NOTE — ED PROVIDER NOTE - PATIENT PORTAL LINK FT
You can access the FollowMyHealth Patient Portal offered by [facility] by registering at the following website: [email or web address]. By joining [employer]s FollowMyHealth portal, you will also be able to view your health information using other applications (apps) compatible with our system.

## 2022-04-06 NOTE — ED PROVIDER NOTE - PHYSICAL EXAMINATION
VITAL SIGNS: I have reviewed nursing notes and confirm.  CONSTITUTIONAL: Well-developed; well-nourished; in no acute distress.  SKIN: Skin exam is warm and dry, no acute rash.  HEAD: Normocephalic; atraumatic.  EYES: PERRL, EOM intact; conjunctiva and sclera clear.  ENT: No nasal discharge; airway clear. TMs clear.  NECK: Supple; non tender.  CARD: S1, S2 normal; no murmurs, gallops, or rubs. Regular rate and rhythm.  RESP: No wheezes, rales or rhonchi.  ABD: Normal bowel sounds; soft; non-distended; non-tender;  PELVIC:    PSYCH: Cooperative, appropriate. VITAL SIGNS: I have reviewed nursing notes and confirm.  CONSTITUTIONAL: Well-developed; well-nourished; in no acute distress.  SKIN: Skin exam is warm and dry, no acute rash.  HEAD: Normocephalic; atraumatic.  EYES: PERRL, EOM intact; conjunctiva and sclera clear.  ENT: No nasal discharge; airway clear. TMs clear.  NECK: Supple; non tender.  CARD: S1, S2 normal; no murmurs, gallops, or rubs. Regular rate and rhythm.  RESP: No wheezes, rales or rhonchi.  ABD: Normal bowel sounds; soft; non-distended; non-tender;  PELVIC:  +white clumpy vaginal discharge, os closed, no cmt  PSYCH: Cooperative, appropriate.

## 2022-04-06 NOTE — ED PROVIDER NOTE - PATIENT PORTAL LINK FT
You can access the FollowMyHealth Patient Portal offered by [facility] by registering at the following website: [email or web address]. By joining "[employer]’s FollowMyHealth portal, you will also be able to view your health information using other applications (apps) compatible with our system.

## 2022-04-06 NOTE — ED PROVIDER NOTE - PHYSICAL EXAMINATION
CONSTITUTIONAL: Well-appearing; well-nourished; in no apparent distress.   GI/: exam deferred as had pelvic hours ago at first ED visit; non-distended; non-tender; no palpable organomegaly. .   SKIN: Normal for age and race; warm; dry; good turgor; no apparent lesions or exudate.   NEURO/PSYCH: A & O x 4; grossly unremarkable. mood and manner are appropriate.

## 2022-04-08 LAB
CULTURE RESULTS: SIGNIFICANT CHANGE UP
SPECIMEN SOURCE: SIGNIFICANT CHANGE UP

## 2022-04-28 ENCOUNTER — APPOINTMENT (OUTPATIENT)
Dept: ANTEPARTUM | Facility: CLINIC | Age: 32
End: 2022-04-28
Payer: MEDICAID

## 2022-04-28 ENCOUNTER — ASOB RESULT (OUTPATIENT)
Age: 32
End: 2022-04-28

## 2022-04-28 ENCOUNTER — OUTPATIENT (OUTPATIENT)
Dept: OUTPATIENT SERVICES | Facility: HOSPITAL | Age: 32
LOS: 1 days | Discharge: HOME | End: 2022-04-28

## 2022-04-28 PROCEDURE — 76817 TRANSVAGINAL US OBSTETRIC: CPT | Mod: 26,59

## 2022-04-29 DIAGNOSIS — O30.041 TWIN PREGNANCY, DICHORIONIC/DIAMNIOTIC, FIRST TRIMESTER: ICD-10-CM

## 2022-04-29 DIAGNOSIS — Z3A.01 LESS THAN 8 WEEKS GESTATION OF PREGNANCY: ICD-10-CM

## 2022-04-29 DIAGNOSIS — O36.80X0 PREGNANCY WITH INCONCLUSIVE FETAL VIABILITY, NOT APPLICABLE OR UNSPECIFIED: ICD-10-CM

## 2022-05-13 ENCOUNTER — APPOINTMENT (OUTPATIENT)
Dept: ANTEPARTUM | Facility: CLINIC | Age: 32
End: 2022-05-13
Payer: MEDICAID

## 2022-05-13 ENCOUNTER — OUTPATIENT (OUTPATIENT)
Dept: OUTPATIENT SERVICES | Facility: HOSPITAL | Age: 32
LOS: 1 days | Discharge: HOME | End: 2022-05-13

## 2022-05-13 ENCOUNTER — ASOB RESULT (OUTPATIENT)
Age: 32
End: 2022-05-13

## 2022-05-13 PROCEDURE — 76817 TRANSVAGINAL US OBSTETRIC: CPT | Mod: 26

## 2022-05-13 PROCEDURE — 99212 OFFICE O/P EST SF 10 MIN: CPT | Mod: 25

## 2022-05-13 PROCEDURE — 76801 OB US < 14 WKS SINGLE FETUS: CPT | Mod: 26

## 2022-06-13 ENCOUNTER — OUTPATIENT (OUTPATIENT)
Dept: OUTPATIENT SERVICES | Facility: HOSPITAL | Age: 32
LOS: 1 days | Discharge: HOME | End: 2022-06-13

## 2022-06-13 ENCOUNTER — ASOB RESULT (OUTPATIENT)
Age: 32
End: 2022-06-13

## 2022-06-13 ENCOUNTER — APPOINTMENT (OUTPATIENT)
Dept: ANTEPARTUM | Facility: CLINIC | Age: 32
End: 2022-06-13
Payer: MEDICAID

## 2022-06-13 ENCOUNTER — LABORATORY RESULT (OUTPATIENT)
Age: 32
End: 2022-06-13

## 2022-06-13 PROCEDURE — 76813 OB US NUCHAL MEAS 1 GEST: CPT | Mod: 26

## 2022-07-22 ENCOUNTER — APPOINTMENT (OUTPATIENT)
Dept: ANTEPARTUM | Facility: CLINIC | Age: 32
End: 2022-07-22

## 2022-07-22 ENCOUNTER — OUTPATIENT (OUTPATIENT)
Dept: OUTPATIENT SERVICES | Facility: HOSPITAL | Age: 32
LOS: 1 days | Discharge: HOME | End: 2022-07-22

## 2022-07-22 ENCOUNTER — ASOB RESULT (OUTPATIENT)
Age: 32
End: 2022-07-22

## 2022-07-22 PROCEDURE — 76817 TRANSVAGINAL US OBSTETRIC: CPT | Mod: 26

## 2022-07-22 PROCEDURE — 76811 OB US DETAILED SNGL FETUS: CPT | Mod: 26

## 2022-07-28 DIAGNOSIS — Z36.3 ENCOUNTER FOR ANTENATAL SCREENING FOR MALFORMATIONS: ICD-10-CM

## 2022-07-28 DIAGNOSIS — Z3A.18 18 WEEKS GESTATION OF PREGNANCY: ICD-10-CM

## 2022-07-28 DIAGNOSIS — O31.10X0 CONTINUING PREGNANCY AFTER SPONTANEOUS ABORTION OF ONE FETUS OR MORE, UNSPECIFIED TRIMESTER, NOT APPLICABLE OR UNSPECIFIED: ICD-10-CM

## 2022-07-28 DIAGNOSIS — O35.9XX0 MATERNAL CARE FOR (SUSPECTED) FETAL ABNORMALITY AND DAMAGE, UNSPECIFIED, NOT APPLICABLE OR UNSPECIFIED: ICD-10-CM

## 2022-07-28 DIAGNOSIS — O99.212 OBESITY COMPLICATING PREGNANCY, SECOND TRIMESTER: ICD-10-CM

## 2022-08-12 ENCOUNTER — APPOINTMENT (OUTPATIENT)
Dept: ANTEPARTUM | Facility: CLINIC | Age: 32
End: 2022-08-12

## 2022-08-12 ENCOUNTER — ASOB RESULT (OUTPATIENT)
Age: 32
End: 2022-08-12

## 2022-08-12 ENCOUNTER — OUTPATIENT (OUTPATIENT)
Dept: OUTPATIENT SERVICES | Facility: HOSPITAL | Age: 32
LOS: 1 days | Discharge: HOME | End: 2022-08-12

## 2022-08-12 PROCEDURE — 76815 OB US LIMITED FETUS(S): CPT | Mod: 26

## 2022-08-17 DIAGNOSIS — O31.10X0 CONTINUING PREGNANCY AFTER SPONTANEOUS ABORTION OF ONE FETUS OR MORE, UNSPECIFIED TRIMESTER, NOT APPLICABLE OR UNSPECIFIED: ICD-10-CM

## 2022-08-17 DIAGNOSIS — Z3A.21 21 WEEKS GESTATION OF PREGNANCY: ICD-10-CM

## 2022-08-17 DIAGNOSIS — O35.9XX0 MATERNAL CARE FOR (SUSPECTED) FETAL ABNORMALITY AND DAMAGE, UNSPECIFIED, NOT APPLICABLE OR UNSPECIFIED: ICD-10-CM

## 2022-08-17 DIAGNOSIS — O99.212 OBESITY COMPLICATING PREGNANCY, SECOND TRIMESTER: ICD-10-CM

## 2022-10-31 ENCOUNTER — APPOINTMENT (OUTPATIENT)
Dept: ANTEPARTUM | Facility: CLINIC | Age: 32
End: 2022-10-31

## 2022-11-15 ENCOUNTER — APPOINTMENT (OUTPATIENT)
Dept: ANTEPARTUM | Facility: CLINIC | Age: 32
End: 2022-11-15

## 2022-11-15 ENCOUNTER — ASOB RESULT (OUTPATIENT)
Age: 32
End: 2022-11-15

## 2022-11-15 ENCOUNTER — OUTPATIENT (OUTPATIENT)
Dept: OUTPATIENT SERVICES | Facility: HOSPITAL | Age: 32
LOS: 1 days | Discharge: HOME | End: 2022-11-15

## 2022-11-15 PROCEDURE — 76816 OB US FOLLOW-UP PER FETUS: CPT | Mod: 26

## 2022-11-15 PROCEDURE — 76818 FETAL BIOPHYS PROFILE W/NST: CPT | Mod: 26,59

## 2022-11-21 ENCOUNTER — OUTPATIENT (OUTPATIENT)
Dept: OUTPATIENT SERVICES | Facility: HOSPITAL | Age: 32
LOS: 1 days | Discharge: HOME | End: 2022-11-21

## 2022-11-21 ENCOUNTER — ASOB RESULT (OUTPATIENT)
Age: 32
End: 2022-11-21

## 2022-11-21 ENCOUNTER — APPOINTMENT (OUTPATIENT)
Dept: ANTEPARTUM | Facility: CLINIC | Age: 32
End: 2022-11-21

## 2022-11-21 ENCOUNTER — RESULT CHARGE (OUTPATIENT)
Age: 32
End: 2022-11-21

## 2022-11-21 VITALS
BODY MASS INDEX: 43.75 KG/M2 | OXYGEN SATURATION: 99 % | HEART RATE: 100 BPM | WEIGHT: 224 LBS | TEMPERATURE: 98.2 F | DIASTOLIC BLOOD PRESSURE: 75 MMHG | SYSTOLIC BLOOD PRESSURE: 124 MMHG

## 2022-11-21 LAB
BILIRUB UR QL STRIP: NORMAL
BP DIAS: 75 MM HG
BP SYS: 124 MM HG
CLARITY UR: CLEAR
COLLECTION METHOD: NORMAL
FETAL HEART RATE (BPM): 140
FETAL MOVEMENT: PRESENT
GLUCOSE BLDC GLUCOMTR-MCNC: 94
GLUCOSE BLDC GLUCOMTR-MCNC: 94 MG/DL — SIGNIFICANT CHANGE UP (ref 70–99)
GLUCOSE UR-MCNC: NORMAL
HCG UR QL: 0.2 EU/DL
HGB UR QL STRIP.AUTO: NORMAL
KETONES UR-MCNC: NORMAL
LEUKOCYTE ESTERASE UR QL STRIP: NORMAL
NITRITE UR QL STRIP: NORMAL
OB COMMENTS: NORMAL
PH UR STRIP: 7
PROT UR STRIP-MCNC: NORMAL
SCHEDULED VISIT: YES
SP GR UR STRIP: 1.02
URINE ALBUMIN/PROTEIN: NORMAL
URINE GLUCOSE: NORMAL
URINE KETONES: NORMAL
WEEKS GESTATION: 35.3

## 2022-11-21 PROCEDURE — 99215 OFFICE O/P EST HI 40 MIN: CPT | Mod: 25

## 2022-11-21 PROCEDURE — 76818 FETAL BIOPHYS PROFILE W/NST: CPT | Mod: 26

## 2022-11-21 PROCEDURE — ZZZZZ: CPT

## 2022-11-21 NOTE — DISCUSSION/SUMMARY
[FreeTextEntry1] : Ms. Brandt is a 32 year old  @ 35 weeks 3 days referred by Dr. Hamlin for initial consultation.    She has gestational diabetes and is on metformin 500 mg PO BID, insulin was offered by Dr. Hamlin and was declined.  She has hypothyroidism and is on levothyroxine 100 mcg PO daily.  This pregnancy was initially a twin pregnancy with a vanishing twin diagnosed in the first trimester.  \par \par She reports decreased fetal movement even last week, this week she feels her fetus the same as last week.\par \par Past medical history:\par Gestational diabetes, on Metformin 500 mg PO BID\par Levothyroxine 100 mcg PO daily\par Aspirin 81 mg PO daily\par \par Past surgical history: none\par \par Allergies: No known drug allergies.  Allergy to shellfish\par \par Medications:\par Metformin 500 mg PO BID\par Levothyroxine 100 mcg PO daily\par Aspirin 81 mg PO daily.\par \par Obstetric history:\par 2016 39 weeks 4 days 7 pounds vaginal delivery female [facility] gestational hypertension \par 2017 37 weeks 4 days 6 pounds vaginal delivery female [facility] gestational diabetes on glyburide\par \par GYN history:\par No fibroids, no abnormal pap smears, no infection\par \par Family History:  Mother with diabetes and hypertension.  Father with hypertension\par \par Social History:  No alcohol use, drug use, or smoking.\par \par Review of systems:  No fevers, chills, problems seeing, problems hearing, chest pain, short of breath, nausea, vomiting, dysuria.  her mood is happy.\par \par Prenatal labs\par One hour \par AFP negative\par NIPS low risk\par Hgb 12.3 Hct 37.3 --> 10.7/32.3\par CMP within normal limits (creatinine was 0.9 on )\par CF negative for mutations analyzed. \par Hemoglobin electrophoresis AA\par HCV NR\par HepBSAg NR\par HIV NR\par Measles immune\par Mumps immune\par Rubella Immune\par Quantiferon negative\par RPR 1:1, Treponema antibody negative\par 2022 TSH 0.948.  Total T4 11.4\par Free t4 0.57 (22) 0.58 (2022)\par Varicella immune\par Ms. Brandt is a 32 year old  @ 35 weeks 3 days referred by Dr. Hamlin for gestational diabetes and hypothyroidism.  This pregnancy started out as a twin pregnancy.  \par   \par General: In no apparent distress.\par HEENT:  Atraumatic.  Extraocular muscles intact. \par Cardiovascular: Regular rate and rhythm, normal S1/S2\par Pulmonary: Clear to auscultation bilaterally.  No wheezing.\par Abdomen: soft, gravid, nontender, nondistended, no rebound, no guarding\par Extremities: no calf tenderness or edema\par Neurology: +2 reflexes in bilateral upper extremities\par Psychiatry:  Happy mood.  Awake , alert, oriented to person, place, time.\par \par \par Counseling: \par \par 1.  The patient declines evaluation by a nutritionist.\par \par 2.  She is familiar with capillary blood glucose testing (fasting and 2 hours after each meal).\par \par 3.  The significance and usual management of diabetes in pregnancy were reviewed, including risks of preeclampsia, Intra-Uterine Fetal Demise, macrosomia, birth trauma, pre-term labor,  section, NICU admission (the main reasons include  hypoglycemia and  jaundice) and the possible need for insulin therapy.\par \par 4.  Exercise was encouraged.  Ideally, she should walk briskly  after each meal.\par \par Recommendations:\par \par 1.  Glycemic Control.  Target glucose ranges to minimize excessive fetal growth are:  Fasting 60-90 mg/dl; preprandial  mg/dl; and 2-hour postprandial < 120 mg/dl.  If these values are elevated, insulin therapy is encouraged.\par \par 2.  Antepartum testing.  Daily fetal movement counts are recommended from 28 weeks.  Weekly or twice weekly biophysical profiles may be recommended, the frequency and timing will depend on glucose control.  Ultrasound assessment of fetal growth and macrosomic features is recommended.\par \par 3.  Timing of Delivery.  Ms. Brandt desires delivery at around 37 weeks gestation.  She understands that increased risk of NICU admission and respiratory issues the earlier a baby is delivered.   Given that she is on metformin, requiring an increase in her dose, and has decreased fetal movement I think this is reasonable. \par \par 4.  Route of delivery.  Diabetes is associated with about a six-fold risk for shoulder dystocia (which includes the risk of irreversible nerve, bone, and brain injury).  Operative vaginal deliveries should be approached with caution if at all.\par \par 5.  Glucose control in labor.  During labor, capillary glucose values should be checked every few hours (depending on their stability).  Insulin may be required to cover elevated glucose levels.\par \par 6.  Long-term diabetes surveillance.  The risk of developing diabetes outside of pregnancy over the next five years may be as high as 50%.  I recommend that she have a 2 hour GTT or similar diabetes screen  at 6 to 12 weeks postpartum with her primary Obstetrician and counseling about ways to minimize her risk.  If her fasting glucose is normal, annual glucose screening by her primary care physician is advised.\par \par \par Gestational diabetes\par - On Metformin 500 mg PO BID\par - 22 - 22 fasting values 101-110\par    2 hour postprandial values  ( elevated)\par - We will keep the Metformin to 500 mg in the morning and increase to 1000 mg at bedtime.\par \par Hypothyroidism\par - On levothyroxine 100 mcg \par - TSH  was 0.95.  Please repeat and add Free T4\par \par Prenatal labs\par - One hour GCT was 211\par - RPR 1:1 on 10/3.  Treponema antibody was negative.\par \par Right fetal renal pelvis dilation 7.7 mm, not discussed with the patient today. \par \par Prenatal care is with Dr. Hamlin.  From what I understand cultures and 3rd trimester labs will be obtained today.\par Hypoglycemia, fetal movement and labor precautions discussed\par D/c aspirin \par For possible induction of labor at 37 weeks gestation.\par Twice weekly biophysical profiles with non stress tests\par Follow up in around one week with the fingerstick log.\par \par Discussed with Dr. Hamlin by phone.\par \par Ms. Brandt expressed understanding and all of her questions were answered to her satisfaction.  Thank you for this consult.\par \par Sunny Porter MD

## 2022-11-22 DIAGNOSIS — O24.415 GESTATIONAL DIABETES MELLITUS IN PREGNANCY, CONTROLLED BY ORAL HYPOGLYCEMIC DRUGS: ICD-10-CM

## 2022-11-22 DIAGNOSIS — Z3A.35 35 WEEKS GESTATION OF PREGNANCY: ICD-10-CM

## 2022-11-22 DIAGNOSIS — O99.283 ENDOCRINE, NUTRITIONAL AND METABOLIC DISEASES COMPLICATING PREGNANCY, THIRD TRIMESTER: ICD-10-CM

## 2022-11-22 DIAGNOSIS — O99.213 OBESITY COMPLICATING PREGNANCY, THIRD TRIMESTER: ICD-10-CM

## 2022-11-25 ENCOUNTER — APPOINTMENT (OUTPATIENT)
Dept: ANTEPARTUM | Facility: CLINIC | Age: 32
End: 2022-11-25

## 2022-11-25 ENCOUNTER — ASOB RESULT (OUTPATIENT)
Age: 32
End: 2022-11-25

## 2022-11-25 ENCOUNTER — OUTPATIENT (OUTPATIENT)
Dept: OUTPATIENT SERVICES | Facility: HOSPITAL | Age: 32
LOS: 1 days | Discharge: HOME | End: 2022-11-25

## 2022-11-25 PROCEDURE — ZZZZZ: CPT

## 2022-11-25 PROCEDURE — 76818 FETAL BIOPHYS PROFILE W/NST: CPT | Mod: 26

## 2022-11-28 ENCOUNTER — RESULT CHARGE (OUTPATIENT)
Age: 32
End: 2022-11-28

## 2022-11-28 ENCOUNTER — INPATIENT (INPATIENT)
Facility: HOSPITAL | Age: 32
LOS: 7 days | Discharge: HOME | End: 2022-12-06
Attending: OBSTETRICS & GYNECOLOGY | Admitting: OBSTETRICS & GYNECOLOGY
Payer: MEDICAID

## 2022-11-28 ENCOUNTER — ASOB RESULT (OUTPATIENT)
Age: 32
End: 2022-11-28

## 2022-11-28 ENCOUNTER — OUTPATIENT (OUTPATIENT)
Dept: OUTPATIENT SERVICES | Facility: HOSPITAL | Age: 32
LOS: 1 days | Discharge: HOME | End: 2022-11-28

## 2022-11-28 ENCOUNTER — APPOINTMENT (OUTPATIENT)
Dept: ANTEPARTUM | Facility: CLINIC | Age: 32
End: 2022-11-28

## 2022-11-28 VITALS
DIASTOLIC BLOOD PRESSURE: 62 MMHG | OXYGEN SATURATION: 99 % | SYSTOLIC BLOOD PRESSURE: 119 MMHG | HEIGHT: 60 IN | HEART RATE: 88 BPM | TEMPERATURE: 98.2 F

## 2022-11-28 VITALS — TEMPERATURE: 99 F

## 2022-11-28 VITALS
HEIGHT: 60 IN | OXYGEN SATURATION: 98 % | SYSTOLIC BLOOD PRESSURE: 125 MMHG | DIASTOLIC BLOOD PRESSURE: 59 MMHG | HEART RATE: 108 BPM | TEMPERATURE: 98.5 F

## 2022-11-28 LAB
ALBUMIN SERPL ELPH-MCNC: 3.9 G/DL — SIGNIFICANT CHANGE UP (ref 3.5–5.2)
ALP SERPL-CCNC: 254 U/L — HIGH (ref 30–115)
ALT FLD-CCNC: 16 U/L — SIGNIFICANT CHANGE UP (ref 0–41)
AMPHET UR-MCNC: NEGATIVE — SIGNIFICANT CHANGE UP
AMYLASE P1 CFR SERPL: 46 U/L — SIGNIFICANT CHANGE UP (ref 25–115)
ANION GAP SERPL CALC-SCNC: 13 MMOL/L — SIGNIFICANT CHANGE UP (ref 7–14)
APPEARANCE UR: ABNORMAL
AST SERPL-CCNC: 19 U/L — SIGNIFICANT CHANGE UP (ref 0–41)
BACTERIA # UR AUTO: ABNORMAL
BARBITURATES UR SCN-MCNC: NEGATIVE — SIGNIFICANT CHANGE UP
BASOPHILS # BLD AUTO: 0.03 K/UL — SIGNIFICANT CHANGE UP (ref 0–0.2)
BASOPHILS NFR BLD AUTO: 0.3 % — SIGNIFICANT CHANGE UP (ref 0–1)
BENZODIAZ UR-MCNC: NEGATIVE — SIGNIFICANT CHANGE UP
BILIRUB SERPL-MCNC: 0.3 MG/DL — SIGNIFICANT CHANGE UP (ref 0.2–1.2)
BILIRUB UR-MCNC: NEGATIVE — SIGNIFICANT CHANGE UP
BLD GP AB SCN SERPL QL: SIGNIFICANT CHANGE UP
BUN SERPL-MCNC: 4 MG/DL — LOW (ref 10–20)
BUPRENORPHINE SCREEN, URINE RESULT: NEGATIVE — SIGNIFICANT CHANGE UP
CALCIUM SERPL-MCNC: 9.3 MG/DL — SIGNIFICANT CHANGE UP (ref 8.4–10.5)
CHLORIDE SERPL-SCNC: 106 MMOL/L — SIGNIFICANT CHANGE UP (ref 98–110)
CO2 SERPL-SCNC: 18 MMOL/L — SIGNIFICANT CHANGE UP (ref 17–32)
COCAINE METAB.OTHER UR-MCNC: NEGATIVE — SIGNIFICANT CHANGE UP
COLOR SPEC: YELLOW — SIGNIFICANT CHANGE UP
CREAT SERPL-MCNC: <0.5 MG/DL — LOW (ref 0.7–1.5)
DIFF PNL FLD: NEGATIVE — SIGNIFICANT CHANGE UP
EGFR: 133 ML/MIN/1.73M2 — SIGNIFICANT CHANGE UP
EOSINOPHIL # BLD AUTO: 0.15 K/UL — SIGNIFICANT CHANGE UP (ref 0–0.7)
EOSINOPHIL NFR BLD AUTO: 1.7 % — SIGNIFICANT CHANGE UP (ref 0–8)
EPI CELLS # UR: 15 /HPF — HIGH (ref 0–5)
FENTANYL UR QL: NEGATIVE — SIGNIFICANT CHANGE UP
GLUCOSE BLDC GLUCOMTR-MCNC: 100 MG/DL — HIGH (ref 70–99)
GLUCOSE BLDC GLUCOMTR-MCNC: 103 MG/DL — HIGH (ref 70–99)
GLUCOSE BLDC GLUCOMTR-MCNC: 117 MG/DL — HIGH (ref 70–99)
GLUCOSE BLDC GLUCOMTR-MCNC: 81 MG/DL — SIGNIFICANT CHANGE UP (ref 70–99)
GLUCOSE SERPL-MCNC: 97 MG/DL — SIGNIFICANT CHANGE UP (ref 70–99)
GLUCOSE UR QL: NEGATIVE — SIGNIFICANT CHANGE UP
HCT VFR BLD CALC: 35.7 % — LOW (ref 37–47)
HGB BLD-MCNC: 12.3 G/DL — SIGNIFICANT CHANGE UP (ref 12–16)
HYALINE CASTS # UR AUTO: 2 /LPF — SIGNIFICANT CHANGE UP (ref 0–7)
IMM GRANULOCYTES NFR BLD AUTO: 1.6 % — HIGH (ref 0.1–0.3)
KETONES UR-MCNC: ABNORMAL
L&D DRUG SCREEN, URINE: SIGNIFICANT CHANGE UP
LEUKOCYTE ESTERASE UR-ACNC: NEGATIVE — SIGNIFICANT CHANGE UP
LIDOCAIN IGE QN: 25 U/L — SIGNIFICANT CHANGE UP (ref 7–60)
LYMPHOCYTES # BLD AUTO: 2.1 K/UL — SIGNIFICANT CHANGE UP (ref 1.2–3.4)
LYMPHOCYTES # BLD AUTO: 24.3 % — SIGNIFICANT CHANGE UP (ref 20.5–51.1)
MCHC RBC-ENTMCNC: 30.1 PG — SIGNIFICANT CHANGE UP (ref 27–31)
MCHC RBC-ENTMCNC: 34.5 G/DL — SIGNIFICANT CHANGE UP (ref 32–37)
MCV RBC AUTO: 87.3 FL — SIGNIFICANT CHANGE UP (ref 81–99)
METHADONE UR-MCNC: NEGATIVE — SIGNIFICANT CHANGE UP
MONOCYTES # BLD AUTO: 0.67 K/UL — HIGH (ref 0.1–0.6)
MONOCYTES NFR BLD AUTO: 7.8 % — SIGNIFICANT CHANGE UP (ref 1.7–9.3)
NEUTROPHILS # BLD AUTO: 5.55 K/UL — SIGNIFICANT CHANGE UP (ref 1.4–6.5)
NEUTROPHILS NFR BLD AUTO: 64.3 % — SIGNIFICANT CHANGE UP (ref 42.2–75.2)
NITRITE UR-MCNC: NEGATIVE — SIGNIFICANT CHANGE UP
NRBC # BLD: 0 /100 WBCS — SIGNIFICANT CHANGE UP (ref 0–0)
OPIATES UR-MCNC: NEGATIVE — SIGNIFICANT CHANGE UP
OXYCODONE UR-MCNC: NEGATIVE — SIGNIFICANT CHANGE UP
PCP UR-MCNC: NEGATIVE — SIGNIFICANT CHANGE UP
PH UR: 6.5 — SIGNIFICANT CHANGE UP (ref 5–8)
PLATELET # BLD AUTO: 356 K/UL — SIGNIFICANT CHANGE UP (ref 130–400)
POTASSIUM SERPL-MCNC: 4.5 MMOL/L — SIGNIFICANT CHANGE UP (ref 3.5–5)
POTASSIUM SERPL-SCNC: 4.5 MMOL/L — SIGNIFICANT CHANGE UP (ref 3.5–5)
PRENATAL SYPHILIS TEST: SIGNIFICANT CHANGE UP
PROPOXYPHENE QUALITATIVE URINE RESULT: NEGATIVE — SIGNIFICANT CHANGE UP
PROT SERPL-MCNC: 6.3 G/DL — SIGNIFICANT CHANGE UP (ref 6–8)
PROT UR-MCNC: SIGNIFICANT CHANGE UP
RBC # BLD: 4.09 M/UL — LOW (ref 4.2–5.4)
RBC # FLD: 13.7 % — SIGNIFICANT CHANGE UP (ref 11.5–14.5)
RBC CASTS # UR COMP ASSIST: 3 /HPF — SIGNIFICANT CHANGE UP (ref 0–4)
SARS-COV-2 RNA SPEC QL NAA+PROBE: SIGNIFICANT CHANGE UP
SODIUM SERPL-SCNC: 137 MMOL/L — SIGNIFICANT CHANGE UP (ref 135–146)
SP GR SPEC: 1.01 — SIGNIFICANT CHANGE UP (ref 1.01–1.03)
UROBILINOGEN FLD QL: SIGNIFICANT CHANGE UP
WBC # BLD: 8.64 K/UL — SIGNIFICANT CHANGE UP (ref 4.8–10.8)
WBC # FLD AUTO: 8.64 K/UL — SIGNIFICANT CHANGE UP (ref 4.8–10.8)
WBC UR QL: SIGNIFICANT CHANGE UP /HPF (ref 0–5)

## 2022-11-28 PROCEDURE — 99222 1ST HOSP IP/OBS MODERATE 55: CPT | Mod: 25

## 2022-11-28 PROCEDURE — 76818 FETAL BIOPHYS PROFILE W/NST: CPT | Mod: 26

## 2022-11-28 PROCEDURE — 59025 FETAL NON-STRESS TEST: CPT | Mod: 26

## 2022-11-28 PROCEDURE — ZZZZZ: CPT

## 2022-11-28 RX ORDER — LEVOTHYROXINE SODIUM 125 MCG
100 TABLET ORAL DAILY
Refills: 0 | Status: DISCONTINUED | OUTPATIENT
Start: 2022-11-28 | End: 2022-12-02

## 2022-11-28 RX ORDER — METFORMIN HYDROCHLORIDE 850 MG/1
1000 TABLET ORAL
Refills: 0 | Status: DISCONTINUED | OUTPATIENT
Start: 2022-11-28 | End: 2022-12-02

## 2022-11-28 RX ADMIN — METFORMIN HYDROCHLORIDE 1000 MILLIGRAM(S): 850 TABLET ORAL at 18:02

## 2022-11-28 NOTE — OB PROVIDER H&P - ATTENDING COMMENTS
36wk poorly controlled GDM sent for admission by [facility]  NST reactive  VS WNL  plan for inpatient monitoring of glucose, continue home meds, NST BID, adjust mediation per [facility] recs, diabetic diet

## 2022-11-28 NOTE — OB PROVIDER H&P - HISTORY OF PRESENT ILLNESS
31 yo  @ 36w3d by 1st trimester lay, HUGO 22. Sent over from [facility] clinic for glycemic control. She states that she has had nausea and some vomiting the past two days. Endorses good fetal movement. She denies any chest pain, severe abdominal pain, epigastric pain, vaginal bleeding, lof, sob. Patient is GDMA2 is on 1000mg metformin BID, FS uncontrolled majority of last few days fasting values have been above 100 up to 130, multiple FS post prandial above 120. H/o hypothyroidism, on 100mcg Synthroid, 50mcg prepregnancy. Taking 81mg aspirin daily for preeclampsia prophylaxis. She is GBS unknown.  33 yo  @ 36w3d by 1st trimester lay, HUGO 22. Sent over from [facility] clinic for glycemic control. She states that she has had nausea and some vomiting the past two days. Endorses good fetal movement. She denies any chest pain, severe abdominal pain, epigastric pain, vaginal bleeding, lof, sob. Patient is GDMA2 is on 1000mg metformin BID, FS uncontrolled majority of last few days fasting values have been above 100 up to 130, multiple FS post prandial above 120. H/o hypothyroidism, on 100mcg Synthroid, 50mcg prepregnancy. Taking 81mg aspirin daily for preeclampsia prophylaxis. She is GBS negative.  31 yo  @ 36w3d by 1st trimester lya, HUGO 22. Sent over from [facility] clinic for glycemic control. She states that she has had nausea and some vomiting the past two days. Endorses good fetal movement. She denies any chest pain, severe abdominal pain, epigastric pain, vaginal bleeding, lof, sob. Patient is GDMA2 is on 1000mg metformin BID, FS uncontrolled majority of last few days fasting values have been above 100 up to 130, multiple FS post prandial above 120. H/o hypothyroidism, on 100mcg Synthroid, 50mcg prepregnancy. Taking 81mg aspirin daily for preeclampsia prophylaxis. Pregnancy also complicated by right fetal renal pelvis dilation. She is GBS negative.

## 2022-11-28 NOTE — OB PROVIDER H&P - ASSESSMENT
33 yo  36w3d, GBS neg, GDMA2 poorly controlled, 1000mg Metformin BID, hypothyroid 100mcg synthroid daily, Admission for glycemic control    -admit to labor and delivery  -pain management prn   -q12 hrs efm & toco  -admission labs  -IV access   -IV hydration   -Diabetic diet  -FS fasting, and 2hr post prandial    Dr. Mayfield and Dr. Goddard aware   33 yo  36w3d, GBS neg, GDMA2 poorly controlled, 1000mg Metformin BID, hypothyroid 100mcg synthroid daily, Admission for glycemic control    -admit to antepartum  -pain management prn   -q12 hrs efm & toco  -admission labs  -IV access   -IV hydration   -Diabetic diet  -FS fasting, and 2hr post prandial     33 yo  36w3d, GBS neg, GDMA2 poorly controlled, 1000mg Metformin BID, hypothyroid 100mcg synthroid daily, with right fetal renal pelvis dilation, Admission for glycemic control    -admit to antepartum  -pain management prn   -q12 hrs efm & toco  -admission labs  -IV access   -IV hydration   -Diabetic diet  -FS fasting, and 2hr post prandial

## 2022-11-28 NOTE — OB RN PATIENT PROFILE - GRAVIDA, OB PROFILE
Geoffrey Huerta          Chief Complaint   Patient presents with    Hypertension    Hypothyroidism    Pain     fibromyalgia       Hypertension   Associated symptoms include headaches (occ migraines). Pertinent negatives include no shortness of breath. Dealing with insurance issues. Has been getting Medicaid then told she couldn't any longer, but might be reinstated. Has no insurance coverage today she just learned, but needs FMLA forms filled out otherwise would have left. Has been without her medications refill for over 1 month; she has been taking her meds  sporadically to make them  last longer. She hasn't made it in for an appt to see me for 1 year. Several cancels and no-shows    Has needed to miss up to 5 days/month due to fibromyalgia flares, for 8 hours/day (not 3 hours as the previous paperwork stated). (occ has to leave early, and this gets marked as a full day off). FMLA was terminated recently when she exceeded her allowed time, she was told. Lives with BF, 5 kids (3-16 yo)  Working at [employer] still, full-time. Likes the job      Current Outpatient Prescriptions on File Prior to Visit   Medication Sig Dispense Refill    etodolac (LODINE) 400 MG tablet Take 1 tablet by mouth 2 times daily 60 tablet 0    methimazole (TAPAZOLE) 5 MG tablet Take 1 tablet by mouth daily 30 tablet 1    ondansetron (ZOFRAN) 4 MG tablet Take 1 tablet by mouth every 8 hours as needed for Nausea 20 tablet 0     No current facility-administered medications on file prior to visit. Review of Systems   Constitutional: Positive for fatigue. Negative for appetite change, chills and fever. HENT: Negative for congestion. Respiratory: Negative for shortness of breath, wheezing and stridor. Gastrointestinal: Negative for abdominal pain, diarrhea, nausea and vomiting. Genitourinary: Positive for vaginal discharge (thick white, itchy). Musculoskeletal: Positive for back pain.    Skin: 3

## 2022-11-28 NOTE — OB PROVIDER H&P - NSLOWPPHRISK_OBGYN_A_OB
No previous uterine incision/Back Pregnancy/Less than or equal to 4 previous vaginal births/No known bleeding disorder/No history of postpartum hemorrhage

## 2022-11-28 NOTE — OB PROVIDER H&P - NSHPLABSRESULTS_GEN_ALL_CORE
11/21/22  HbsAg neg  HIV neg  GBS neg  RPR neg    05/04/22  MMR Immune    Sono: 11/21/22  HbsAg neg  HIV neg  GBS neg  RPR neg    05/04/22  MMR Immune    Sono:  11/28/22  36w3d vtx post placenta BPP 10/10 MVP 6.55  11/15/22  34w4d 6lb5oz 87% vtx post placenta  7/23/22  18w0d 8oz anatomy wnl f/u wnl following week

## 2022-11-28 NOTE — OB PROVIDER H&P - NSHPPHYSICALEXAM_GEN_ALL_CORE
Vital Signs Last 24 Hrs  T(C): 37 (28 Nov 2022 11:52), Max: 37.1 (28 Nov 2022 11:45)  T(F): 98.6 (28 Nov 2022 11:52), Max: 98.7 (28 Nov 2022 11:45)  HR: 80 (28 Nov 2022 11:52) (80 - 104)  BP: 123/79 (28 Nov 2022 11:52) (123/79 - 123/79)  RR: 16 (28 Nov 2022 11:52) (16 - 16)    Gen: NAD, AOx3  Abdomen: Soft, gravid, non tender, no palpable contractions    EFM:130bpm/mod/pos accels  [place]:irritability  SVE: 0/0/-3 Vital Signs Last 24 Hrs  T(C): 37 (28 Nov 2022 11:52), Max: 37.1 (28 Nov 2022 11:45)  T(F): 98.6 (28 Nov 2022 11:52), Max: 98.7 (28 Nov 2022 11:45)  HR: 80 (28 Nov 2022 11:52) (80 - 104)  BP: 123/79 (28 Nov 2022 11:52) (123/79 - 123/79)  RR: 16 (28 Nov 2022 11:52) (16 - 16)  FS: 100mg/dL (last meal @0730)    Gen: NAD, AOx3  Abdomen: Soft, gravid, non tender, no palpable contractions    EFM:130bpm/mod/pos accels  [place]: irritability  SVE: 0/0/-3

## 2022-11-28 NOTE — OB RN PATIENT PROFILE - FALL HARM RISK - UNIVERSAL INTERVENTIONS
Bed in lowest position, wheels locked, appropriate side rails in place/Call bell, personal items and telephone in reach/Instruct patient to call for assistance before getting out of bed or chair/Non-slip footwear when patient is out of bed/[place] to call system/Physically safe environment - no spills, clutter or unnecessary equipment/Purposeful Proactive Rounding/Room/bathroom lighting operational, light cord in reach

## 2022-11-28 NOTE — OB PROVIDER H&P - NS_OBGYNHISTORY_OBGYN_ALL_OB_FT
OB Hx:   P1 14 F 7lbs  P2 15 M 6lb2oz GDMA2    Gyn Hx: Denies h/o abnormal pap, STI, ovarian cysts, or fibroids

## 2022-11-28 NOTE — PROGRESS NOTE ADULT - SUBJECTIVE AND OBJECTIVE BOX
PGY 1 Note    Pt seen at bedside following report from nurse of vomiting and diarrhea. Pt reports she ate the hospital dinner at 5pm, and then felt nauseas and had 1 episode of vomiting and diarrhea. After she felt better. Denies any sweating, dizziness, palliations, SOB, or abdominal pain. Reports that she has been irregularly byron for several days, and since the episode the contractions have slightly increased in intensity and now occur every 15 minutes or so. She reports that she drank an appropriate amount of water today. She says she believes the event happened because the hospital food is no good, and she would like to bring her own food from home eat. Post prandial finger stick following meal was 103.     Vital Signs Last 24 Hrs  T(C): 37.1 (2022 19:51), Max: 37.2 (2022 15:16)  T(F): 98.8 (2022 19:51), Max: 98.9 (2022 15:16)  HR: 97 (2022 19:51) (80 - 104)  BP: 108/59 (2022 19:51) (108/59 - 123/79)  RR: 18 (2022 19:51) (16 - 18)    A/P: 31 yo  36w3d, GBS neg, GDMA2 poorly controlled, 1000mg Metformin BID, hypothyroid 100mcg synthroid daily, admitted for glycemic control, with vomiting and diarrhea, now resolved  -PO hydration encouraged  -FS fasting and 2 hour post prandial   -NST to monitor for contractions  -Diet: carb consistent with snack    Dr. Zelaya aware, Dr. Walker to be aware

## 2022-11-29 DIAGNOSIS — O36.60X0 MATERNAL CARE FOR EXCESSIVE FETAL GROWTH, UNSPECIFIED TRIMESTER, NOT APPLICABLE OR UNSPECIFIED: ICD-10-CM

## 2022-11-29 DIAGNOSIS — Z3A.36 36 WEEKS GESTATION OF PREGNANCY: ICD-10-CM

## 2022-11-29 DIAGNOSIS — O99.283 ENDOCRINE, NUTRITIONAL AND METABOLIC DISEASES COMPLICATING PREGNANCY, THIRD TRIMESTER: ICD-10-CM

## 2022-11-29 DIAGNOSIS — O40.9XX0 POLYHYDRAMNIOS, UNSPECIFIED TRIMESTER, NOT APPLICABLE OR UNSPECIFIED: ICD-10-CM

## 2022-11-29 DIAGNOSIS — O24.419 GESTATIONAL DIABETES MELLITUS IN PREGNANCY, UNSPECIFIED CONTROL: ICD-10-CM

## 2022-11-29 DIAGNOSIS — O99.210 OBESITY COMPLICATING PREGNANCY, UNSPECIFIED TRIMESTER: ICD-10-CM

## 2022-11-29 DIAGNOSIS — Z02.9 ENCOUNTER FOR ADMINISTRATIVE EXAMINATIONS, UNSPECIFIED: ICD-10-CM

## 2022-11-29 LAB
GLUCOSE BLDC GLUCOMTR-MCNC: 118 MG/DL — HIGH (ref 70–99)
GLUCOSE BLDC GLUCOMTR-MCNC: 118 MG/DL — HIGH (ref 70–99)
GLUCOSE BLDC GLUCOMTR-MCNC: 121 MG/DL — HIGH (ref 70–99)

## 2022-11-29 RX ORDER — DEXTROSE 50 % IN WATER 50 %
25 SYRINGE (ML) INTRAVENOUS ONCE
Refills: 0 | Status: DISCONTINUED | OUTPATIENT
Start: 2022-11-29 | End: 2022-12-03

## 2022-11-29 RX ORDER — SODIUM CHLORIDE 9 MG/ML
1000 INJECTION, SOLUTION INTRAVENOUS
Refills: 0 | Status: DISCONTINUED | OUTPATIENT
Start: 2022-11-29 | End: 2022-12-03

## 2022-11-29 RX ORDER — GLUCAGON INJECTION, SOLUTION 0.5 MG/.1ML
1 INJECTION, SOLUTION SUBCUTANEOUS ONCE
Refills: 0 | Status: DISCONTINUED | OUTPATIENT
Start: 2022-11-29 | End: 2022-12-03

## 2022-11-29 RX ORDER — METOCLOPRAMIDE HCL 10 MG
10 TABLET ORAL
Refills: 0 | Status: DISCONTINUED | OUTPATIENT
Start: 2022-11-29 | End: 2022-12-03

## 2022-11-29 RX ORDER — HUMAN INSULIN 100 [IU]/ML
4 INJECTION, SUSPENSION SUBCUTANEOUS AT BEDTIME
Refills: 0 | Status: DISCONTINUED | OUTPATIENT
Start: 2022-11-29 | End: 2022-11-30

## 2022-11-29 RX ORDER — DEXTROSE 50 % IN WATER 50 %
12.5 SYRINGE (ML) INTRAVENOUS ONCE
Refills: 0 | Status: DISCONTINUED | OUTPATIENT
Start: 2022-11-29 | End: 2022-12-03

## 2022-11-29 RX ORDER — DEXTROSE 50 % IN WATER 50 %
15 SYRINGE (ML) INTRAVENOUS ONCE
Refills: 0 | Status: DISCONTINUED | OUTPATIENT
Start: 2022-11-29 | End: 2022-12-03

## 2022-11-29 RX ORDER — FAMOTIDINE 10 MG/ML
20 INJECTION INTRAVENOUS
Refills: 0 | Status: DISCONTINUED | OUTPATIENT
Start: 2022-11-29 | End: 2022-12-03

## 2022-11-29 RX ADMIN — Medication 100 MICROGRAM(S): at 06:35

## 2022-11-29 RX ADMIN — HUMAN INSULIN 4 UNIT(S): 100 INJECTION, SUSPENSION SUBCUTANEOUS at 21:35

## 2022-11-29 RX ADMIN — METFORMIN HYDROCHLORIDE 1000 MILLIGRAM(S): 850 TABLET ORAL at 06:35

## 2022-11-29 RX ADMIN — FAMOTIDINE 20 MILLIGRAM(S): 10 INJECTION INTRAVENOUS at 17:22

## 2022-11-29 RX ADMIN — Medication 10 MILLIGRAM(S): at 12:13

## 2022-11-29 RX ADMIN — METFORMIN HYDROCHLORIDE 1000 MILLIGRAM(S): 850 TABLET ORAL at 17:20

## 2022-11-29 RX ADMIN — Medication 10 MILLIGRAM(S): at 17:21

## 2022-11-29 NOTE — PROGRESS NOTE ADULT - SUBJECTIVE AND OBJECTIVE BOX
PGY 3 Antepartum Note    GA: 36.4  HD#:2  Reason for admission: glycemic control    HPI: Patient seen and examined at bedside, reports episodes of vomiting yesterday around 8:30pm right after dinner. Continuos to endorse vaginal pressure. Otherwsie, reports good fetal movements. Denies contractions, leakage of fluid, vaginal bleeding.      Vital Signs Last 24 Hrs  T(C): 36.4 (2022 03:30), Max: 37.2 (2022 15:16)  T(F): 97.5 (2022 03:30), Max: 98.9 (2022 15:16)  HR: 107 (2022 03:30) (80 - 107)  BP: 101/60 (2022 03:30) (93/52 - 123/79)  RR: 16 (2022 03:30) (16 - 18)    SVE on : 0/0/-3    GA: AAOx3 in NAD  CV: normal s1s2  Pulm: CTAB  abd: gravid, nontender    Medications:  metFORMIN: 1000 milliGRAM(s) BID  Synthroid 100mcg      Labs:                        12.3   8.64  )-----------( 356      ( 2022 12:45 )             35.7         137  |  106  |  4<L>  ----------------------------<  97  4.5   |  18  |  <0.5<L>    Ca    9.3      2022 12:45    TPro  6.3  /  Alb  3.9  /  TBili  0.3  /  DBili  x   /  AST  19  /  ALT  16  /  AlkPhos  254<H>      ABO RH Interpretation: O POS (22 @ 13:10)    Urinalysis Basic - ( 2022 14:51 )    Color: Yellow / Appearance: Slightly Turbid / S.012 / pH: x  Gluc: x / Ketone: Small  / Bili: Negative / Urobili: <2 mg/dL   Blood: x / Protein: Trace / Nitrite: Negative   Leuk Esterase: Negative / RBC: 3 /HPF / WBC 1-2 /HPF   Sq Epi: x / Non Sq Epi: 15 /HPF / Bacteria: Few        OBUS:  11/28: vertex, post placenta, MVP 6.55cm, BPP 8/8    FINGERSTICKS:  : 94 (random)/117/100/81/103     Electrodesiccation And Curettage Text: The wound bed was treated with electrodesiccation and curettage after the biopsy was performed.

## 2022-11-29 NOTE — PROGRESS NOTE ADULT - ASSESSMENT
33 y/o  36w4d, GBS neg, GDMA2 poorly controlled, 1000mg Metformin BID, hypothyroid 100mcg synthroid daily, Admission for glycemic control    #GDMA2  - continue carb consistent diet  - fingersticks: fasting and 2hrs postprandial  - continue Metformin 1000mg BID  - Delivery plan: as of right now continues to be at 37w, she is scheduled for IOL on Friday,     #Pregnancy  - PNV  - NST BID    MFM to be made aware

## 2022-11-30 DIAGNOSIS — O24.419 GESTATIONAL DIABETES MELLITUS IN PREGNANCY, UNSPECIFIED CONTROL: ICD-10-CM

## 2022-11-30 DIAGNOSIS — O36.60X0 MATERNAL CARE FOR EXCESSIVE FETAL GROWTH, UNSPECIFIED TRIMESTER, NOT APPLICABLE OR UNSPECIFIED: ICD-10-CM

## 2022-11-30 DIAGNOSIS — O99.283 ENDOCRINE, NUTRITIONAL AND METABOLIC DISEASES COMPLICATING PREGNANCY, THIRD TRIMESTER: ICD-10-CM

## 2022-11-30 DIAGNOSIS — O99.210 OBESITY COMPLICATING PREGNANCY, UNSPECIFIED TRIMESTER: ICD-10-CM

## 2022-11-30 LAB
A1C WITH ESTIMATED AVERAGE GLUCOSE RESULT: 6.1 % — HIGH (ref 4–5.6)
ESTIMATED AVERAGE GLUCOSE: 128 MG/DL — HIGH (ref 68–114)
GLUCOSE BLDC GLUCOMTR-MCNC: 104 MG/DL — HIGH (ref 70–99)
GLUCOSE BLDC GLUCOMTR-MCNC: 107 MG/DL — HIGH (ref 70–99)
GLUCOSE BLDC GLUCOMTR-MCNC: 111 MG/DL — HIGH (ref 70–99)
GLUCOSE BLDC GLUCOMTR-MCNC: 118 MG/DL — HIGH (ref 70–99)
GLUCOSE BLDC GLUCOMTR-MCNC: 132 MG/DL — HIGH (ref 70–99)

## 2022-11-30 PROCEDURE — 99232 SBSQ HOSP IP/OBS MODERATE 35: CPT | Mod: 25

## 2022-11-30 PROCEDURE — 76818 FETAL BIOPHYS PROFILE W/NST: CPT | Mod: 26,59

## 2022-11-30 RX ORDER — HUMAN INSULIN 100 [IU]/ML
6 INJECTION, SUSPENSION SUBCUTANEOUS AT BEDTIME
Refills: 0 | Status: DISCONTINUED | OUTPATIENT
Start: 2022-11-30 | End: 2022-12-02

## 2022-11-30 RX ADMIN — Medication 10 MILLIGRAM(S): at 06:44

## 2022-11-30 RX ADMIN — HUMAN INSULIN 6 UNIT(S): 100 INJECTION, SUSPENSION SUBCUTANEOUS at 22:13

## 2022-11-30 RX ADMIN — METFORMIN HYDROCHLORIDE 1000 MILLIGRAM(S): 850 TABLET ORAL at 18:45

## 2022-11-30 RX ADMIN — FAMOTIDINE 20 MILLIGRAM(S): 10 INJECTION INTRAVENOUS at 18:45

## 2022-11-30 RX ADMIN — Medication 10 MILLIGRAM(S): at 15:33

## 2022-11-30 RX ADMIN — Medication 100 MICROGRAM(S): at 06:43

## 2022-11-30 RX ADMIN — METFORMIN HYDROCHLORIDE 1000 MILLIGRAM(S): 850 TABLET ORAL at 06:44

## 2022-11-30 NOTE — PROGRESS NOTE ADULT - ATTENDING COMMENTS
[facility] Staff    I saw and evaluated Ms. GERMANIA VELÁSQUEZ  with Dr. Madison.  Ms. GERMANIA VELÁSQUEZ reports positive fetal movement and no vaginal bleeding.  She reports occasional cramping.    General:  Ms. GERMANIA VELÁSQUEZ is lying in bed.  She appears comfortable.    Vital Signs Last 24 Hrs  T(C): 36.9 (2022 11:08), Max: 37.2 (2022 19:54)  T(F): 98.4 (2022 11:08), Max: 99 (2022 19:54)  HR: 97 (2022 11:08) (94 - 102)  BP: 134/69 (2022 11:08) (98/54 - 140/66)  BP(mean): --  RR: 18 (2022 11:08) (18 - 18)  SpO2: --      Abdomen:  Soft, nontender, nondistended, no rebound, no guarding.  Extremities:  Nontender calves.                        12.3   8.64  )-----------( 356      ( 2022 12:45 )             35.7         137  |  106  |  4   ----------------------------<  97  4.5   |  18  |  <0.5    Ca    9.3      2022 12:45    TPro  6.3  /  Alb  3.9  /  TBili  0.3  /  DBili  x   /  AST  19  /  ALT  16  /  AlkPhos  254  22    CAPILLARY BLOOD GLUCOSE      POCT Blood Glucose.: 111 mg/dL (2022 11:18)  POCT Blood Glucose.: 118 mg/dL (2022 07:53)  POCT Blood Glucose.: 132 mg/dL (2022 00:51)   POCT Blood Glucose.: 121 mg/dL (2022 16:51)    Ms. Velásquez is a 33 y/o  @ 36w5d with GDM A2, suboptimal control. She is on 1000 mg Metformin BID and NPH at night.  She also has hypothyroidism and is on 100 mcg synthroid daily. [facility] Staff    I saw and evaluated Ms. GERMANIA VELÁSQUEZ  with Dr. Madison.  Ms. GERMANIA VELÁSQUEZ reports positive fetal movement and no vaginal bleeding.  She reports occasional cramping.    General:  Ms. GERMANIA VELÁSQUEZ is lying in bed.  She appears comfortable.    Vital Signs Last 24 Hrs  T(C): 36.9 (2022 11:08), Max: 37.2 (2022 19:54)  T(F): 98.4 (2022 11:08), Max: 99 (2022 19:54)  HR: 97 (2022 11:08) (94 - 102)  BP: 134/69 (2022 11:08) (98/54 - 140/66)  BP(mean): --  RR: 18 (2022 11:08) (18 - 18)  SpO2: --      Abdomen:  Soft, nontender, nondistended, no rebound, no guarding.  Extremities:  Nontender calves.                        12.3   8.64  )-----------( 356      ( 2022 12:45 )             35.7         137  |  106  |  4   ----------------------------<  97  4.5   |  18  |  <0.5    Ca    9.3      2022 12:45    TPro  6.3  /  Alb  3.9  /  TBili  0.3  /  DBili  x   /  AST  19  /  ALT  16  /  AlkPhos  254  22    CAPILLARY BLOOD GLUCOSE      POCT Blood Glucose.: 111 mg/dL (2022 11:18)  POCT Blood Glucose.: 118 mg/dL (2022 07:53)  POCT Blood Glucose.: 132 mg/dL (2022 00:51)   POCT Blood Glucose.: 121 mg/dL (2022 16:51)    Ms. Velásquez is a 33 y/o  @ 36w5d with GDM A2, suboptimal control. She is on 1000 mg Metformin BID and NPH at night.  She also has hypothyroidism and is on 100 mcg synthroid daily.      GDMA2  - Continue Metformin 1000 mg PO BID  - Increase NPH at night to 6 units  - Monitor fingersticks.    Hypothyroidism  - Continue levothyroxine 100 mcg daily    NST BID.  BPP today was 8/10, we will repeat the BPP later today.  We previously discussed timing of delivery, Ms. Velásquez is scheduled to be delivered at around 37 weeks gestation.    Sunny Porter MD

## 2022-11-30 NOTE — PROGRESS NOTE ADULT - ASSESSMENT
31 y/o  36w5d, GBS neg, GDMA2 poorly controlled, 1000mg Metformin BID, hypothyroid 100mcg synthroid daily, Admission for glycemic control    #GDMA2  - Currently on NPH 4u at bedtime (first dose given last night)--> Increase NPH to 6u at bedtime, due to persistent elevated fasting FS  - continue carb consistent diet  - fingersticks: fasting and 2hrs postprandial  - continue Metformin 1000mg BID  - Delivery plan: as of right now continues to be at 37w, she is scheduled for IOL on Friday,   - repeat BPP later today to check for breathing    #Pregnancy  - monitor for signs and symptoms of labor  - PNV  - NST BID    MFM to be made aware

## 2022-11-30 NOTE — PROGRESS NOTE ADULT - SUBJECTIVE AND OBJECTIVE BOX
PGY 3 Antepartum Note    GA: 36.5  HD#: 3  Reason for admission: glycemic control    HPI: Patient seen and examined at bedside, reports vaginal pressure [id number]. Denies leakage of fluid, vaginal bleeding. Reports good fetal movement. Denies fever, chills, headache, vision changes, SOB, chest pain, nausea, vomiting, diarrhea, dysuria or LE pain. Had dinner yesterday at 9pm. Did not have a bedtime snack. Denies episodes of hypoglycemia.    Vital Signs Last 24 Hrs  T(C): 36.4 (2022 07:48), Max: 37.2 (2022 11:56)  T(F): 97.5 (2022 07:48), Max: 99 (2022 19:54)  HR: 94 (2022 07:48) (94 - 102)  BP: 140/66 (2022 07:48) (98/54 - 140/66)  RR: 18 (2022 07:48) (18 - 18)    GA: AAOx3 in NAD  abd: gravid, nontender, palpable contractions    BSS: vertex, MVP 4.5cm, FHR 131bpm, BPP 6/8 (breathing <30secs)    Medications:  NPH 4u at bedtime      Labs:                        12.3   8.64  )-----------( 356      ( 2022 12:45 )             35.7         137  |  106  |  4<L>  ----------------------------<  97  4.5   |  18  |  <0.5<L>    Ca    9.3      2022 12:45    TPro  6.3  /  Alb  3.9  /  TBili  0.3  /  DBili  x   /  AST  19  /  ALT  16  /  AlkPhos  254<H>        Urinalysis Basic - ( 2022 14:51 )    Color: Yellow / Appearance: Slightly Turbid / S.012 / pH: x  Gluc: x / Ketone: Small  / Bili: Negative / Urobili: <2 mg/dL   Blood: x / Protein: Trace / Nitrite: Negative   Leuk Esterase: Negative / RBC: 3 /HPF / WBC 1-2 /HPF   Sq Epi: x / Non Sq Epi: 15 /HPF / Bacteria: Few    FINGERSTICKS   94(random)/117/100/81/103   118/118/121/132   118/

## 2022-11-30 NOTE — PROGRESS NOTE ADULT - SUBJECTIVE AND OBJECTIVE BOX
MFM procedure note    Gestational diabetes    Non stress test    Date:  11/30/2022  Start: 11:30 AM  End:  11:56 AM    Baseline: 135  beats per minute  Variability:  moderate  Accelerations: present  Decelerations:  absent    Tocometer:  occasional contractions    Bedside ultrasound  Vertex biophysical profile 8/10 (-2 for breathing)    Repeat biophysical profile this afternoon.    Sunny Porter MD

## 2022-12-01 ENCOUNTER — APPOINTMENT (OUTPATIENT)
Dept: ANTEPARTUM | Facility: CLINIC | Age: 32
End: 2022-12-01

## 2022-12-01 LAB
BLD GP AB SCN SERPL QL: SIGNIFICANT CHANGE UP
GLUCOSE BLDC GLUCOMTR-MCNC: 113 MG/DL — HIGH (ref 70–99)
GLUCOSE BLDC GLUCOMTR-MCNC: 85 MG/DL — SIGNIFICANT CHANGE UP (ref 70–99)
GLUCOSE BLDC GLUCOMTR-MCNC: 95 MG/DL — SIGNIFICANT CHANGE UP (ref 70–99)
GLUCOSE BLDC GLUCOMTR-MCNC: 98 MG/DL — SIGNIFICANT CHANGE UP (ref 70–99)

## 2022-12-01 RX ADMIN — FAMOTIDINE 20 MILLIGRAM(S): 10 INJECTION INTRAVENOUS at 06:36

## 2022-12-01 RX ADMIN — FAMOTIDINE 20 MILLIGRAM(S): 10 INJECTION INTRAVENOUS at 18:27

## 2022-12-01 RX ADMIN — METFORMIN HYDROCHLORIDE 1000 MILLIGRAM(S): 850 TABLET ORAL at 18:26

## 2022-12-01 RX ADMIN — Medication 100 MICROGRAM(S): at 06:37

## 2022-12-01 RX ADMIN — METFORMIN HYDROCHLORIDE 1000 MILLIGRAM(S): 850 TABLET ORAL at 06:37

## 2022-12-01 RX ADMIN — HUMAN INSULIN 6 UNIT(S): 100 INJECTION, SUSPENSION SUBCUTANEOUS at 22:08

## 2022-12-01 RX ADMIN — Medication 10 MILLIGRAM(S): at 06:52

## 2022-12-01 RX ADMIN — Medication 10 MILLIGRAM(S): at 12:34

## 2022-12-01 NOTE — PROGRESS NOTE ADULT - SUBJECTIVE AND OBJECTIVE BOX
PGY 3 Antepartum Note    GA: 36.6  HD#: 4  Reason for admission: glycemic control    HPI: Patient seen at bedside, found ambulating around room. Reports contractions are [id number]. Reports increase vaginal discharge, no burning or itching. Otherwise denies leakage of fluid, vaginal bleeding. Reports good fetal movement.    Vital Signs Last 24 Hrs  T(C): 36.8 (01 Dec 2022 03:19), Max: 37 (30 Nov 2022 23:20)  T(F): 98.2 (01 Dec 2022 03:19), Max: 98.6 (30 Nov 2022 23:20)  HR: 105 (01 Dec 2022 03:19) (92 - 108)  BP: 117/63 (01 Dec 2022 03:19) (101/58 - 134/69)  RR: 18 (01 Dec 2022 03:19) (18 - 18)    GA: AAOx3 in NAD  abd: gravid, nontender, palpable contractions    11/30 SVE: @1700 0/0/-3      Medications:  famotidine    Tablet: 20 milliGRAM(s) (12-01-22 @ 06:36),  20 milliGRAM(s) (11-30-22 @ 18:45),  20 milliGRAM(s) (11-29-22 @ 17:22)  insulin NPH human recombinant: 6 Unit(s) (11-30-22 @ 22:13)  levothyroxine: 100 MICROGram(s) (12-01-22 @ 06:37),  100 MICROGram(s) (11-30-22 @ 06:43),  100 MICROGram(s) (11-29-22 @ 06:35)  metFORMIN: 1000 milliGRAM(s) (12-01-22 @ 06:37),  1000 milliGRAM(s) (11-30-22 @ 18:45),  1000 milliGRAM(s) (11-30-22 @ 06:44),  1000 milliGRAM(s) (11-29-22 @ 17:20),  1000 milliGRAM(s) (11-29-22 @ 06:35),  1000 milliGRAM(s) (11-28-22 @ 18:02)  metoclopramide: 10 milliGRAM(s) (12-01-22 @ 06:52),  10 milliGRAM(s) (11-30-22 @ 15:33),  10 milliGRAM(s) (11-30-22 @ 06:44),  10 milliGRAM(s) (11-29-22 @ 17:21),  10 milliGRAM(s) (11-29-22 @ 12:13)      Labs:   11/28: 8.64>12.3/35.7<256, 137/4.5/106/18/4/0.5<97, AST.ALT 19/16, amylase 46, lipase 25, O pos      FINGERSTICKS  11/28 94(random)/117/100/81/103  11/29 118/118/121/132  11/30 118/111/104/207  12/1: 95/      OBUS:   11/30 BSS: vertex, MVP 4.5cm, FHR 131bpm, BPP 6/8 (breathing <30secs)---> repeat BSS: vtx, post placenta, MVP 6.93cm, BPP 8/8

## 2022-12-01 NOTE — PROGRESS NOTE ADULT - ASSESSMENT
31 y/o  36w5d, GBS neg, GDMA2 poorly controlled, 1000mg Metformin BID, hypothyroid 100mcg synthroid daily, Admission for glycemic control    #GDMA2  - Currently on NPH 6u at bedtime   - continue carb consistent diet  - fingersticks: fasting and 2hrs postprandial  - continue Metformin 1000mg BID  - Delivery plan: as of right now continues to be at 37w, she is scheduled for IOL on Friday,     #Pregnancy  - monitor for signs and symptoms of labor  - PNV  - NST BID    MFM to be made aware

## 2022-12-02 ENCOUNTER — TRANSCRIPTION ENCOUNTER (OUTPATIENT)
Age: 32
End: 2022-12-02

## 2022-12-02 LAB
ALBUMIN SERPL ELPH-MCNC: 3.6 G/DL — SIGNIFICANT CHANGE UP (ref 3.5–5.2)
ALP SERPL-CCNC: 256 U/L — HIGH (ref 30–115)
ALT FLD-CCNC: 19 U/L — SIGNIFICANT CHANGE UP (ref 0–41)
ANION GAP SERPL CALC-SCNC: 13 MMOL/L — SIGNIFICANT CHANGE UP (ref 7–14)
APTT BLD: 31 SEC — SIGNIFICANT CHANGE UP (ref 27–39.2)
AST SERPL-CCNC: 21 U/L — SIGNIFICANT CHANGE UP (ref 0–41)
BASOPHILS # BLD AUTO: 0.03 K/UL — SIGNIFICANT CHANGE UP (ref 0–0.2)
BASOPHILS NFR BLD AUTO: 0.3 % — SIGNIFICANT CHANGE UP (ref 0–1)
BILIRUB SERPL-MCNC: 0.3 MG/DL — SIGNIFICANT CHANGE UP (ref 0.2–1.2)
BUN SERPL-MCNC: <3 MG/DL — LOW (ref 10–20)
CALCIUM SERPL-MCNC: 8.8 MG/DL — SIGNIFICANT CHANGE UP (ref 8.4–10.5)
CHLORIDE SERPL-SCNC: 102 MMOL/L — SIGNIFICANT CHANGE UP (ref 98–110)
CO2 SERPL-SCNC: 20 MMOL/L — SIGNIFICANT CHANGE UP (ref 17–32)
CREAT ?TM UR-MCNC: 45 MG/DL — SIGNIFICANT CHANGE UP
CREAT SERPL-MCNC: <0.5 MG/DL — LOW (ref 0.7–1.5)
EGFR: 133 ML/MIN/1.73M2 — SIGNIFICANT CHANGE UP
EOSINOPHIL # BLD AUTO: 0.18 K/UL — SIGNIFICANT CHANGE UP (ref 0–0.7)
EOSINOPHIL NFR BLD AUTO: 1.7 % — SIGNIFICANT CHANGE UP (ref 0–8)
FIBRINOGEN PPP-MCNC: >700 MG/DL — HIGH (ref 204.4–570.6)
GLUCOSE BLDC GLUCOMTR-MCNC: 104 MG/DL — HIGH (ref 70–99)
GLUCOSE BLDC GLUCOMTR-MCNC: 105 MG/DL — HIGH (ref 70–99)
GLUCOSE BLDC GLUCOMTR-MCNC: 110 MG/DL — HIGH (ref 70–99)
GLUCOSE BLDC GLUCOMTR-MCNC: 147 MG/DL — HIGH (ref 70–99)
GLUCOSE BLDC GLUCOMTR-MCNC: 76 MG/DL — SIGNIFICANT CHANGE UP (ref 70–99)
GLUCOSE BLDC GLUCOMTR-MCNC: 85 MG/DL — SIGNIFICANT CHANGE UP (ref 70–99)
GLUCOSE SERPL-MCNC: 118 MG/DL — HIGH (ref 70–99)
HCT VFR BLD CALC: 35.7 % — LOW (ref 37–47)
HGB BLD-MCNC: 12.2 G/DL — SIGNIFICANT CHANGE UP (ref 12–16)
HIV 1 & 2 AB SERPL IA.RAPID: SIGNIFICANT CHANGE UP
IMM GRANULOCYTES NFR BLD AUTO: 1 % — HIGH (ref 0.1–0.3)
INR BLD: 0.94 RATIO — SIGNIFICANT CHANGE UP (ref 0.65–1.3)
LDH SERPL L TO P-CCNC: 157 — SIGNIFICANT CHANGE UP (ref 50–242)
LYMPHOCYTES # BLD AUTO: 1.74 K/UL — SIGNIFICANT CHANGE UP (ref 1.2–3.4)
LYMPHOCYTES # BLD AUTO: 16.4 % — LOW (ref 20.5–51.1)
MCHC RBC-ENTMCNC: 30 PG — SIGNIFICANT CHANGE UP (ref 27–31)
MCHC RBC-ENTMCNC: 34.2 G/DL — SIGNIFICANT CHANGE UP (ref 32–37)
MCV RBC AUTO: 87.9 FL — SIGNIFICANT CHANGE UP (ref 81–99)
MONOCYTES # BLD AUTO: 0.61 K/UL — HIGH (ref 0.1–0.6)
MONOCYTES NFR BLD AUTO: 5.7 % — SIGNIFICANT CHANGE UP (ref 1.7–9.3)
NEUTROPHILS # BLD AUTO: 7.94 K/UL — HIGH (ref 1.4–6.5)
NEUTROPHILS NFR BLD AUTO: 74.9 % — SIGNIFICANT CHANGE UP (ref 42.2–75.2)
NRBC # BLD: 0 /100 WBCS — SIGNIFICANT CHANGE UP (ref 0–0)
PLATELET # BLD AUTO: 325 K/UL — SIGNIFICANT CHANGE UP (ref 130–400)
POTASSIUM SERPL-MCNC: 4.1 MMOL/L — SIGNIFICANT CHANGE UP (ref 3.5–5)
POTASSIUM SERPL-SCNC: 4.1 MMOL/L — SIGNIFICANT CHANGE UP (ref 3.5–5)
PROT ?TM UR-MCNC: 6 MG/DLG/24H — SIGNIFICANT CHANGE UP
PROT SERPL-MCNC: 6 G/DL — SIGNIFICANT CHANGE UP (ref 6–8)
PROT/CREAT UR-RTO: 0.1 RATIO — SIGNIFICANT CHANGE UP (ref 0–0.2)
PROTHROM AB SERPL-ACNC: 10.7 SEC — SIGNIFICANT CHANGE UP (ref 9.95–12.87)
RAPID RVP RESULT: SIGNIFICANT CHANGE UP
RBC # BLD: 4.06 M/UL — LOW (ref 4.2–5.4)
RBC # FLD: 13.8 % — SIGNIFICANT CHANGE UP (ref 11.5–14.5)
SARS-COV-2 RNA SPEC QL NAA+PROBE: SIGNIFICANT CHANGE UP
SODIUM SERPL-SCNC: 135 MMOL/L — SIGNIFICANT CHANGE UP (ref 135–146)
URATE SERPL-MCNC: 2.5 MG/DL — SIGNIFICANT CHANGE UP (ref 2.5–7)
WBC # BLD: 10.61 K/UL — SIGNIFICANT CHANGE UP (ref 4.8–10.8)
WBC # FLD AUTO: 10.61 K/UL — SIGNIFICANT CHANGE UP (ref 4.8–10.8)

## 2022-12-02 RX ORDER — METFORMIN HYDROCHLORIDE 850 MG/1
1000 TABLET ORAL DAILY
Refills: 0 | Status: DISCONTINUED | OUTPATIENT
Start: 2022-12-02 | End: 2022-12-02

## 2022-12-02 RX ORDER — OXYTOCIN 10 UNIT/ML
2 VIAL (ML) INJECTION
Qty: 30 | Refills: 0 | Status: DISCONTINUED | OUTPATIENT
Start: 2022-12-02 | End: 2022-12-02

## 2022-12-02 RX ORDER — SODIUM CHLORIDE 9 MG/ML
1000 INJECTION, SOLUTION INTRAVENOUS
Refills: 0 | Status: DISCONTINUED | OUTPATIENT
Start: 2022-12-02 | End: 2022-12-03

## 2022-12-02 RX ORDER — DEXAMETHASONE 0.5 MG/5ML
4 ELIXIR ORAL EVERY 6 HOURS
Refills: 0 | Status: DISCONTINUED | OUTPATIENT
Start: 2022-12-02 | End: 2022-12-03

## 2022-12-02 RX ORDER — METFORMIN HYDROCHLORIDE 850 MG/1
1000 TABLET ORAL AT BEDTIME
Refills: 0 | Status: DISCONTINUED | OUTPATIENT
Start: 2022-12-02 | End: 2022-12-03

## 2022-12-02 RX ORDER — CEFAZOLIN SODIUM 1 G
2000 VIAL (EA) INJECTION ONCE
Refills: 0 | Status: COMPLETED | OUTPATIENT
Start: 2022-12-02 | End: 2022-12-02

## 2022-12-02 RX ORDER — AZITHROMYCIN 500 MG/1
500 TABLET, FILM COATED ORAL ONCE
Refills: 0 | Status: COMPLETED | OUTPATIENT
Start: 2022-12-02 | End: 2022-12-03

## 2022-12-02 RX ORDER — ONDANSETRON 8 MG/1
4 TABLET, FILM COATED ORAL EVERY 6 HOURS
Refills: 0 | Status: DISCONTINUED | OUTPATIENT
Start: 2022-12-02 | End: 2022-12-03

## 2022-12-02 RX ORDER — OXYTOCIN 10 UNIT/ML
2 VIAL (ML) INJECTION
Qty: 30 | Refills: 0 | Status: DISCONTINUED | OUTPATIENT
Start: 2022-12-02 | End: 2022-12-03

## 2022-12-02 RX ORDER — GUAIFENESIN/DEXTROMETHORPHAN 600MG-30MG
10 TABLET, EXTENDED RELEASE 12 HR ORAL EVERY 4 HOURS
Refills: 0 | Status: DISCONTINUED | OUTPATIENT
Start: 2022-12-02 | End: 2022-12-03

## 2022-12-02 RX ORDER — METFORMIN HYDROCHLORIDE 850 MG/1
1000 TABLET ORAL DAILY
Refills: 0 | Status: DISCONTINUED | OUTPATIENT
Start: 2022-12-02 | End: 2022-12-03

## 2022-12-02 RX ORDER — NALOXONE HYDROCHLORIDE 4 MG/.1ML
0.1 SPRAY NASAL
Refills: 0 | Status: DISCONTINUED | OUTPATIENT
Start: 2022-12-02 | End: 2022-12-03

## 2022-12-02 RX ORDER — DINOPROSTONE 10 MG/241MG
10 INSERT VAGINAL ONCE
Refills: 0 | Status: COMPLETED | OUTPATIENT
Start: 2022-12-02 | End: 2022-12-02

## 2022-12-02 RX ORDER — FENTANYL/BUPIVACAINE/NS/PF 2MCG/ML-.1
250 PLASTIC BAG, INJECTION (ML) INJECTION
Refills: 0 | Status: DISCONTINUED | OUTPATIENT
Start: 2022-12-02 | End: 2022-12-03

## 2022-12-02 RX ORDER — LEVOTHYROXINE SODIUM 125 MCG
50 TABLET ORAL DAILY
Refills: 0 | Status: DISCONTINUED | OUTPATIENT
Start: 2022-12-02 | End: 2022-12-03

## 2022-12-02 RX ADMIN — Medication 10 MILLILITER(S): at 14:07

## 2022-12-02 RX ADMIN — Medication 100 MILLIGRAM(S): at 23:59

## 2022-12-02 RX ADMIN — Medication 10 MILLILITER(S): at 02:29

## 2022-12-02 RX ADMIN — FAMOTIDINE 20 MILLIGRAM(S): 10 INJECTION INTRAVENOUS at 05:58

## 2022-12-02 RX ADMIN — DINOPROSTONE 10 MILLIGRAM(S): 10 INSERT VAGINAL at 01:57

## 2022-12-02 RX ADMIN — METFORMIN HYDROCHLORIDE 1000 MILLIGRAM(S): 850 TABLET ORAL at 10:49

## 2022-12-02 RX ADMIN — Medication 50 MICROGRAM(S): at 05:57

## 2022-12-02 RX ADMIN — Medication 2 MILLIUNIT(S)/MIN: at 18:18

## 2022-12-02 NOTE — PROGRESS NOTE ADULT - ASSESSMENT
A/P: 31 y/o  @ 37w0d, GBS neg, GDMA2 poorly controlled, hypothyroid, admission for glycemic control, now for induction of labor, s/p cervidil AROM clear, progressing well  - Cont EFM/[place]  - IVF hydration  - Pain management prn  - Monitor vitals  - Clear liquid diet  - FS q4 hour in latent labor, q2 hour in active labor  - Metformin 1000mg BID ordered  - Synthroid 50mcg ordered (prepregnancy dose)  - f/u GBS    Dr Chiang at bedside   A/P: 31 y/o  @ 37w0d, GBS neg, GDMA2 poorly controlled, hypothyroid, admission for glycemic control, now for induction of labor at term for GDMA2 + reported decreased FM, s/p cervidil with cervical change noted. AROM clear at 145pm, progressing well but noted with intermittently elevated blood pressures for r/o preE    - Cont EFM/[place]  - IVF hydration  - Pain management prn  - Monitor vitals  - Clear liquid diet  - FS q4 hour in latent labor, q2 hour in active labor  - Metformin 1000mg BID ordered  - Synthroid 50mcg ordered (prepregnancy dose)  - f/u GBS  - re-examine in 4 hrs or sooner if clinically indicated   - f/u preE labs  - pain management PRN    Dr Chiang at bedside

## 2022-12-02 NOTE — PROGRESS NOTE ADULT - ATTENDING COMMENTS
Patient now comfortable with epidural. [id number], vtx presentation confirmed. Favorable cervix. Category 1 tracing. Plan to start pitocin for labor augmentation. Re-examine in 4hrs or sooner if indicated. Plan of care discussed with patient. All questions answered

## 2022-12-02 NOTE — PROGRESS NOTE ADULT - SUBJECTIVE AND OBJECTIVE BOX
PGY3 Note    Patient seen at bedside. No complaints at the moment.    T(F): 99.32 (12-02 @ 17:07), Max: 99.32 (12-02 @ 17:07)  HR: 131 (12-02 @ 17:15)  BP: 111/55 (12-02 @ 17:14) (104/56 - 146/71)  RR: 18 (12-02 @ 16:43)  EFM: 125bpm/moderate variability/+accelerations/ no decelerations  TOCO: q3mins  SVE: 3/50/-3 vtx ruptured @0145    Medications:  dinoprostone Insert: 10 (12-02 @ 01:57)  famotidine    Tablet: 20 (12-02 @ 05:58),  20 (12-01 @ 18:27),  20 (12-01 @ 06:36),  20 (11-30 @ 18:45),  20 (11-29 @ 17:22)  insulin NPH human recombinant: 6 (12-01 @ 22:08),  6 (11-30 @ 22:13)  insulin NPH human recombinant: 4 (11-29 @ 21:35)  levothyroxine: 50 (12-02 @ 05:57)  levothyroxine: 100 (12-01 @ 06:37),  100 (11-30 @ 06:43),  100 (11-29 @ 06:35)  metFORMIN: 1000 (12-01 @ 18:26),  1000 (12-01 @ 06:37),  1000 (11-30 @ 18:45),  1000 (11-30 @ 06:44),  1000 (11-29 @ 17:20),  1000 (11-29 @ 06:35),  1000 (11-28 @ 18:02)  metFORMIN: 1000 (12-02 @ 10:49)  metoclopramide: 10 (12-01 @ 12:34),  10 (12-01 @ 06:52),  10 (11-30 @ 15:33),  10 (11-30 @ 06:44),  10 (11-29 @ 17:21),  10 (11-29 @ 12:13)  epidural @1445    Labs:                        12.2   10.61 )-----------( 325      ( 02 Dec 2022 15:26 )             35.7     12-02    135  |  102  |  <3<L>  ----------------------------<  118<H>  4.1   |  20  |  <0.5<L>    Ca    8.8      02 Dec 2022 15:26    TPro  6.0  /  Alb  3.6  /  TBili  0.3  /  DBili  x   /  AST  21  /  ALT  19  /  AlkPhos  256<H>  12-02    Rapid HIV-1/2 Antibody: Nonreact (12-02 @ 02:20)  Uric Acid, Serum: 2.5 mg/dL (12-02 @ 15:26)             PGY3 Note - PITOCIN NOTE    Patient seen at bedside. No complaints at the moment.    T(F): 99.32 (12-02 @ 17:07), Max: 99.32 (12-02 @ 17:07)  HR: 131 (12-02 @ 17:15)  BP: 111/55 (12-02 @ 17:14) (104/56 - 146/71)  RR: 18 (12-02 @ 16:43)  EFM: 125bpm/moderate variability/+accelerations/ no decelerations  TOCO: q3mins  SVE: 3/50/-3 vtx ruptured @0145p    Medications:  dinoprostone Insert: 10 (12-02 @ 01:57)  famotidine    Tablet: 20 (12-02 @ 05:58),  20 (12-01 @ 18:27),  20 (12-01 @ 06:36),  20 (11-30 @ 18:45),  20 (11-29 @ 17:22)  insulin NPH human recombinant: 6 (12-01 @ 22:08),  6 (11-30 @ 22:13)  insulin NPH human recombinant: 4 (11-29 @ 21:35)  levothyroxine: 50 (12-02 @ 05:57)  levothyroxine: 100 (12-01 @ 06:37),  100 (11-30 @ 06:43),  100 (11-29 @ 06:35)  metFORMIN: 1000 (12-01 @ 18:26),  1000 (12-01 @ 06:37),  1000 (11-30 @ 18:45),  1000 (11-30 @ 06:44),  1000 (11-29 @ 17:20),  1000 (11-29 @ 06:35),  1000 (11-28 @ 18:02)  metFORMIN: 1000 (12-02 @ 10:49)  metoclopramide: 10 (12-01 @ 12:34),  10 (12-01 @ 06:52),  10 (11-30 @ 15:33),  10 (11-30 @ 06:44),  10 (11-29 @ 17:21),  10 (11-29 @ 12:13)  epidural @1445    Labs:                        12.2   10.61 )-----------( 325      ( 02 Dec 2022 15:26 )             35.7     12-02    135  |  102  |  <3<L>  ----------------------------<  118<H>  4.1   |  20  |  <0.5<L>    Ca    8.8      02 Dec 2022 15:26    TPro  6.0  /  Alb  3.6  /  TBili  0.3  /  DBili  x   /  AST  21  /  ALT  19  /  AlkPhos  256<H>  12-02    Rapid HIV-1/2 Antibody: Nonreact (12-02 @ 02:20)  Uric Acid, Serum: 2.5 mg/dL (12-02 @ 15:26)

## 2022-12-02 NOTE — PROGRESS NOTE ADULT - SUBJECTIVE AND OBJECTIVE BOX
PGY 1 Note    Patient seen at bedside for evaluation of labor progression. Reports doing well with epidural. WIthout complaints.    T(F): 99.14 (19:06)  HR: 121 (20:05)  BP: 117/76 (19:52)  RR: 18 (16:43)    EFM: 150/mod/+accels  TOCO:  q4m  SVE: 4/70/-2, IUPC placed    Labs:                        12.2   10.61 )-----------( 325      ( 02 Dec 2022 15:26 )             35.7     12-02    135  |  102  |  <3<L>  ----------------------------<  118<H>  4.1   |  20  |  <0.5<L>    Ca    8.8      02 Dec 2022 15:26    TPro  6.0  /  Alb  3.6  /  TBili  0.3  /  DBili  x   /  AST  21  /  ALT  19  /  AlkPhos  256<H>  12-02            Meds: dextrose 5%. 1000 milliLiter(s) IV Continuous <Continuous>  dextrose 5%. 1000 milliLiter(s) IV Continuous <Continuous>  dextrose 5%. 1000 milliLiter(s) IV Continuous <Continuous>  dextrose 50% Injectable 25 Gram(s) IV Push once  dextrose 50% Injectable 12.5 Gram(s) IV Push once  dextrose 50% Injectable 25 Gram(s) IV Push once  famotidine    Tablet 20 milliGRAM(s) Oral two times a day  fentanyl (2 MICROgram(s)/mL) + bupivacaine 0.0625%  in 0.9% Sodium Chloride PCEA 250 milliLiter(s) Epidural PCA Continuous  glucagon  Injectable 1 milliGRAM(s) IntraMuscular once  levothyroxine 50 MICROGram(s) Oral daily  metFORMIN 1000 milliGRAM(s) Oral at bedtime  metFORMIN 1000 milliGRAM(s) Oral daily  metoclopramide 10 milliGRAM(s) Oral three times a day before meals  oxytocin Infusion. 2 milliUNIT(s)/Min IV Continuous <Continuous>

## 2022-12-02 NOTE — PROGRESS NOTE ADULT - SUBJECTIVE AND OBJECTIVE BOX
PGY3 Note    Patient seen at bedside. No complaints at the moment.    T(F): 98.6 (12-02 @ 08:30), Max: 98.6 (12-02 @ 08:30)  HR: 120 (12-02 @ 11:26)  BP: 143/84 (12-02 @ 11:26) (104/56 - 146/71)  RR: 18 (12-01 @ 23:15)  EFM: 145bpm/moderate variability/+accelerations/no decelerations  TOCO: q3-4mins  SVE: 3/70/-2 vtx AROM clear    Medications:    dinoprostone Insert: 10 (12-02 @ 01:57)  famotidine    Tablet: 20 (12-02 @ 05:58),  20 (12-01 @ 18:27),  20 (12-01 @ 06:36),  20 (11-30 @ 18:45),  20 (11-29 @ 17:22)  insulin NPH human recombinant: 6 (12-01 @ 22:08),  6 (11-30 @ 22:13)  insulin NPH human recombinant: 4 (11-29 @ 21:35)  levothyroxine: 50 (12-02 @ 05:57)  levothyroxine: 100 (12-01 @ 06:37),  100 (11-30 @ 06:43),  100 (11-29 @ 06:35)  metFORMIN: 1000 (12-01 @ 18:26),  1000 (12-01 @ 06:37),  1000 (11-30 @ 18:45),  1000 (11-30 @ 06:44),  1000 (11-29 @ 17:20),  1000 (11-29 @ 06:35),  1000 (11-28 @ 18:02)  metFORMIN: 1000 (12-02 @ 10:49)  metoclopramide: 10 (12-01 @ 12:34),  10 (12-01 @ 06:52),  10 (11-30 @ 15:33),  10 (11-30 @ 06:44),  10 (11-29 @ 17:21),  10 (11-29 @ 12:13)      Labs:          Rapid HIV-1/2 Antibody: Nonreact (12-02 @ 02:20)             PGY3 Note    Patient seen at bedside. No complaints at the moment. Reports contractions. Denies VB/LOF    T(F): 98.6 (12-02 @ 08:30), Max: 98.6 (12-02 @ 08:30)  HR: 120 (12-02 @ 11:26)  BP: 143/84 (12-02 @ 11:26) (104/56 - 146/71)  RR: 18 (12-01 @ 23:15)  EFM: 145bpm/moderate variability/+accelerations/no decelerations  TOCO: q3-4mins  SVE: 3/70/-2 vtx AROM clear at 145p    Medications:    dinoprostone Insert: 10 (12-02 @ 01:57)  famotidine    Tablet: 20 (12-02 @ 05:58),  20 (12-01 @ 18:27),  20 (12-01 @ 06:36),  20 (11-30 @ 18:45),  20 (11-29 @ 17:22)  insulin NPH human recombinant: 6 (12-01 @ 22:08),  6 (11-30 @ 22:13)  insulin NPH human recombinant: 4 (11-29 @ 21:35)  levothyroxine: 50 (12-02 @ 05:57)  levothyroxine: 100 (12-01 @ 06:37),  100 (11-30 @ 06:43),  100 (11-29 @ 06:35)  metFORMIN: 1000 (12-01 @ 18:26),  1000 (12-01 @ 06:37),  1000 (11-30 @ 18:45),  1000 (11-30 @ 06:44),  1000 (11-29 @ 17:20),  1000 (11-29 @ 06:35),  1000 (11-28 @ 18:02)  metFORMIN: 1000 (12-02 @ 10:49)  metoclopramide: 10 (12-01 @ 12:34),  10 (12-01 @ 06:52),  10 (11-30 @ 15:33),  10 (11-30 @ 06:44),  10 (11-29 @ 17:21),  10 (11-29 @ 12:13)      Labs:      FS: 104/147/76    Rapid HIV-1/2 Antibody: Nonreact (12-02 @ 02:20)

## 2022-12-02 NOTE — OB PROVIDER LABOR PROGRESS NOTE - ASSESSMENT
A/P: 31 y/o  @ 37w0d, GBS neg, GDMA2 poorly controlled, hypothyroid, admission for glycemic control, now undergoing IOL at term for GDMA2 + reported decreased FM, s/p cervidil, s/p epidural and AROM at 145pm with elevated blood pressure r/o gHTN PCR 0.1 . Patient noted to be fully dilated at 10pm however maternal exhaustion noted with pushing without descent. Discussed options to patient who desires to labor down. Plan discussed that due to concerns for possible shoulder dystocia,  delivery is recommended if no descent is noted in 1-2 hrs.       - Cont EFM  - IVF hydration  - Pain management with epidural  - Monitor vitals  - NPO  - FS q4 hour in latent labor, q2 hour in active labor  - Synthroid 50mcg ordered (prepregnancy dose)  - re-assess in 2 hrs    JONO Blancas&ALONDRA Attending

## 2022-12-02 NOTE — PROGRESS NOTE ADULT - SUBJECTIVE AND OBJECTIVE BOX
PGY1 Note    Pt seen at bedside for start of labor induction. Pt reports doing well. Reports good FM, denies LOF or VB. Reports irregular contractions.     Vital Signs Last 24 Hrs  T(C): 36.6 (01 Dec 2022 23:15), Max: 36.8 (01 Dec 2022 03:19)  T(F): 97.8 (01 Dec 2022 23:15), Max: 98.2 (01 Dec 2022 03:19)  HR: 108 (01 Dec 2022 23:15) (73 - 108)  BP: 120/61 (01 Dec 2022 23:15) (97/55 - 146/71)  RR: 18 (01 Dec 2022 23:15) (18 - 18)    Gen: NAD, sitting comfortably  Abd: Gravid, soft, NT, no palpable ctx  SVE: ,vtx, intact  EFM: /mod/+accels  [place]: Q m  Sono:      PGY1 Note    Pt seen at bedside for start of labor induction. Pt reports doing well. Reports good FM, denies LOF or VB. Reports irregular contractions.     Vital Signs Last 24 Hrs  T(C): 36.6 (01 Dec 2022 23:15), Max: 36.8 (01 Dec 2022 03:19)  T(F): 97.8 (01 Dec 2022 23:15), Max: 98.2 (01 Dec 2022 03:19)  HR: 108 (01 Dec 2022 23:15) (73 - 108)  BP: 120/61 (01 Dec 2022 23:15) (97/55 - 146/71)  RR: 18 (01 Dec 2022 23:15) (18 - 18)    Gen: NAD, sitting comfortably  Abd: Gravid, soft, NT, no palpable ctx  EFM: 140/mod/+accels  [place]: irregular  [place]: cephalic

## 2022-12-02 NOTE — PRE-ANESTHESIA EVALUATION ADULT - NSANTHPMHFT_GEN_ALL_CORE
33 y/o  @ 37w0d, GBS neg, GDMA2 poorly controlled, hypothyroid, admission for glycemic control, now for induction of labor at term for GDMA2 + reported decreased FM, s/p cervidil with cervical change noted

## 2022-12-02 NOTE — PROGRESS NOTE ADULT - ASSESSMENT
A/P: 31 y/o  @ 37w0d, GBS neg, GDMA2 poorly controlled, hypothyroid, admission for glycemic control, now for induction of labor at term for GDMA2 + reported decreased FM, s/p cervidil, s/p epidural, with isolated elevated blood pressure r/o gHTN vs pre-eclampsia, IUPC in place, in early labor  - Cont EFM/[place]  - IVF hydration  - Pain management with epidural  - Monitor vitals  - Clear liquid diet  - FS q4 hour in latent labor, q2 hour in active labor  - Metformin 1000mg BID ordered  - Synthroid 50mcg ordered (prepregnancy dose)  - f/u GBS  - re-examine in 4 hrs or sooner if clinically indicated   - f/u urprcr   -cont pitocin, currently @6U    Dr. Zelaya aware Dr. Chiang to be aware A/P: 33 y/o  @ 37w0d, GBS neg, GDMA2 poorly controlled, hypothyroid, admission for glycemic control, now for induction of labor at term for GDMA2 + reported decreased FM, s/p cervidil, s/p epidural and AROM at 145pm with elevated blood pressure r/o gHTN vs pre-eclampsia, IUPC inserted place, in early labor      - Cont EFM/IUPC  - IVF hydration  - Pain management with epidural  - Monitor vitals  - Clear liquid diet  - FS q4 hour in latent labor, q2 hour in active labor  - Metformin 1000mg BID ordered  - Synthroid 50mcg ordered (prepregnancy dose)  - f/u GBS  - re-examine in 4 hrs or sooner if clinically indicated   - f/u uprcr   -cont pitocin, currently @6U    Dr. Zelaya aware Dr. Chiang to be aware

## 2022-12-02 NOTE — PROGRESS NOTE ADULT - ATTENDING COMMENTS
31 y/o  @ 37w0d undergoing IOL as above. Plan of care discussed with patient. IUPC inserted to allow appropriate pitocin titraton until adequate. Re-examine in 4 hrs or sooner if indicated.

## 2022-12-02 NOTE — PROGRESS NOTE ADULT - ATTENDING COMMENTS
agree with above, labs reviewed: HIV/RPR/HEPBSA neg, H/H11.3/35.7, GBS pending  Saw and examined patient at bedside with Dr To. EFW ~3700 by my leopold's. Pt counseled on increased risk of shoulder dystocia with poorly controlled GDMA2      IOL counselling performed. Patient aware of indication and methods for IOL (ICB, pitocin, AROM, prostaglandins). R/B/A of IOL discussed and patient aware of possible hemorrhage/infection/ delivery). Counselled on pain control options as needed. Patient endorsed understanding and all questions answered    Intermittently elevated blood pressures noted. Given obesity, GDM2,  h/o pregnancy induced HTN, plan to send preE labs and monitor vitals closely     Plan to re-examine 4 hrs from ROM or sooner if clinically indicated   continue care as above

## 2022-12-02 NOTE — PROGRESS NOTE ADULT - ASSESSMENT
A/P: 31 y/o  @ 37w0d, GBS neg, GDMA2 poorly controlled, hypothyroid, admission for glycemic control, now for induction of labor, cervidil in place.     - Cont EFM/[place]  - IVF hydration  - Pain management prn  - Monitor vitals  - Clear liquid diet  - FS q4 hour in latent labor, q2 hour in active labor  - Metformin 1000mg BID ordered  - Synthroid 50mcg ordered (prepregnancy dose)  - f/u RVP, GBS    Dr Goddard and Dr Chiang aware

## 2022-12-02 NOTE — PROCEDURE NOTE - ADDITIONAL PROCEDURE DETAILS
Patient sitting. Lumbar epidural performed at L3-4. Standard ASA monitors including FHR. Sterile gloves, Chloro-prep. 1% lidocaine for local infiltration. 17g touhy. MADISON with air 5cm. Touhy needle removed. Catheter secured in place at 12 cm. Negative aspiration. Test dose consisting of 3ml 1.5% lidocaine with epinephrine was negative. Remaining 2ml of test dose consisting of 1.5% lidocaine with epinephrine. Patient tolerated procedure and was hemodynamically stable throughout. 10 .125% bupivacaine epidural.  T10 level bilaterally. Attending physician has been notified.     Post Procedure Patient evaluated at bedside.  Hemodynamically stable, degree of motor block appropriate for epidural medication administered. Pain is well controlled bilaterally. Patient is aware that the anesthesia team is available 24/7, in case she needs more pain medication or has any other anesthesia-related needs or questions.     .0625% Bupivacaine +2ucg/cc Fentanyl epidural PCEA 10/5/15    Top Off .25% Bupivacaine ML  Top Off .125% Bupivacaine ML Patient sitting. Lumbar epidural performed at L3-4. Standard ASA monitors including FHR. Sterile gloves, Chloro-prep. 1% lidocaine for local infiltration. 17g touhy. MADISON with air 5cm. Touhy needle removed. Catheter secured in place at 12 cm. Negative aspiration. Test dose consisting of 3ml 1.5% lidocaine with epinephrine was negative. Remaining 2ml of test dose consisting of 1.5% lidocaine with epinephrine. Patient tolerated procedure and was hemodynamically stable throughout. 10 .125% bupivacaine epidural.  T10 level bilaterally. Attending physician has been notified.     Post Procedure Patient evaluated at bedside.  Hemodynamically stable, degree of motor block appropriate for epidural medication administered. Pain is well controlled bilaterally. Patient is aware that the anesthesia team is available 24/7, in case she needs more pain medication or has any other anesthesia-related needs or questions.     .0625% Bupivacaine +2ucg/cc Fentanyl epidural PCEA 10/5/15    Top Off .25% Bupivacaine ML  Top Off .125% Bupivacaine ML    "arrest of labor large baby" to OR for CD  Epidural in-situ working.

## 2022-12-02 NOTE — PROGRESS NOTE ADULT - ASSESSMENT
A/P: 31 y/o  36w6d, GBS neg, GDMA2 poorly controlled, 1000mg Metformin BID, hypothyroid 100mcg synthroid daily, Admission for glycemic control, now for induction of labor  -Cont EFM/toco  -clears, IVF  -Vitals per routine  -FS q4hr latent labor, q2hr active labor  -GBS  -IOL with cervidil    Dr. Zelaya and Dr. Rahman aware   A/P: 33 y/o  36w6d, GBS neg, GDMA2 poorly controlled, 1000mg Metformin BID, hypothyroid 100mcg synthroid daily (50mcg pre-preg), Admission for glycemic control, now for induction of labor  -Cont EFM/toco  -clears, IVF  -Vitals per routine  -Synthroid 50mcg ordered  -FS q4hr latent labor, q2hr active labor  -GBS  -IOL with cervidil    Dr. Zelaya and Dr. Rahman aware   A/P: 33 y/o  37w, GBS neg, GDMA2 poorly controlled, 1000mg Metformin BID, hypothyroid 100mcg synthroid daily (50mcg pre-preg), Admission for glycemic control, now for induction of labor  -Cont EFM/toco  -clears, IVF  -Vitals per routine  -Synthroid 50mcg ordered  -FS q4hr latent labor, q2hr active labor  -GBS  -IOL with cervidil    Dr. Zelaya and Dr. Rahman aware

## 2022-12-02 NOTE — OB PROVIDER IHI INDUCTION/AUGMENTATION NOTE - NS_OBIHIINDICATION_OBGYN_ALL_OB
Failure to dilate/Failure to descend/Inadequate uterine contraction/Failureto dilate  Failure to descend  Inadequate uterine contraction
Diabetes

## 2022-12-02 NOTE — PROGRESS NOTE ADULT - ASSESSMENT
A/P: 31 y/o  @ 37w0d, GBS neg, GDMA2 poorly controlled, hypothyroid, admission for glycemic control, now for induction of labor at term for GDMA2 + reported decreased FM, s/p cervidil, s/p epidural, with isolated elevated blood pressure r/o gHTN vs pre-eclampsia in early labor  - Cont EFM/[place]  - IVF hydration  - Pain management with epidural  - Monitor vitals  - Clear liquid diet  - FS q4 hour in latent labor, q2 hour in active labor  - Metformin 1000mg BID ordered  - Synthroid 50mcg ordered (prepregnancy dose)  - f/u GBS  - re-examine in 4 hrs or sooner if clinically indicated   - f/u urprcr     Amoabeng at bedside

## 2022-12-02 NOTE — PRE-ANESTHESIA EVALUATION ADULT - NSANTHOSAYNRD_GEN_A_CORE
No. JARETT screening performed.  STOP BANG Legend: 0-2 = LOW Risk; 3-4 = INTERMEDIATE Risk; 5-8 = HIGH Risk

## 2022-12-02 NOTE — PROGRESS NOTE ADULT - SUBJECTIVE AND OBJECTIVE BOX
PGY2 Note    S: Patient seen and examined at bedside. Doing well, resting comfortably.     Vital Signs Last 24 Hrs  T(C): 37.0 (02 Dec 2022 08:30), Max: 37.0 (02 Dec 2022 08:30)  T(F): 98.6 (02 Dec 2022 08:30), Max: 98.6 (02 Dec 2022 08:30)  HR: 93 (02 Dec 2022 07:34) (73 - 116)  BP: 133/87 (02 Dec 2022 07:34) (97/55 - 146/71)  RR: 18 (01 Dec 2022 23:15) (18 - 18)    EFM: 130/mod radha/pos accel  TOCO: irregular  SVE: deferred, last exam at 0157: 0/0/-2, cervidil inserted    Labs:  11/28: 8.64>12.3/35.7<356 137/4.5/106/18/4/0.5<97 AST/ALT 19/16 Amylase 46 Lipase 25, O pos anti neg, UA neg, RPR NR, UDS neg  11/30: hgbA1C 6.1  12/1: O pos, HIV NR    UDS: negative  Covid: negative    Meds:  Cervidil: in @0157      POCT Blood Glucose.: 104 mg/dL (12.02.22 @ 05:55)  Historical Values  POCT Blood Glucose.: 104 mg/dL (12.02.22 @ 05:55)   POCT Blood Glucose.: 113 mg/dL (12.01.22 @ 21:57)   POCT Blood Glucose.: 98 mg/dL (12.01.22 @ 15:32)   POCT Blood Glucose.: 85 mg/dL (12.01.22 @ 11:10)   POCT Blood Glucose.: 95 mg/dL (12.01.22 @ 07:53)   POCT Blood Glucose.: 107 mg/dL (11.30.22 @ 20:27)   POCT Blood Glucose.: 104 mg/dL (11.30.22 @ 15:03)   POCT Blood Glucose.: 111 mg/dL (11.30.22 @ 11:18)   POCT Blood Glucose.: 118 mg/dL (11.30.22 @ 07:53)   POCT Blood Glucose.: 132 mg/dL (11.30.22 @ 00:51)   POCT Blood Glucose.: 121 mg/dL (11.29.22 @ 16:51)   POCT Blood Glucose.: 118 mg/dL (11.29.22 @ 11:00)   POCT Blood Glucose.: 118 mg/dL (11.29.22 @ 07:23)   POCT Blood Glucose.: 103 mg/dL (11.28.22 @ 19:09)   POCT Blood Glucose.: 81 mg/dL (11.28.22 @ 16:44)   POCT Blood Glucose.: 100 mg/dL (11.28.22 @ 12:13)   POCT Blood Glucose.: 117 mg/dL (11.28.22 @ 10:10)   POCT Blood Glucose.: 94 mg/dL (11.21.22 @ 11:51)

## 2022-12-03 ENCOUNTER — RESULT REVIEW (OUTPATIENT)
Age: 32
End: 2022-12-03

## 2022-12-03 LAB
ALBUMIN SERPL ELPH-MCNC: 2.9 G/DL — LOW (ref 3.5–5.2)
ALP SERPL-CCNC: 191 U/L — HIGH (ref 30–115)
ALT FLD-CCNC: 19 U/L — SIGNIFICANT CHANGE UP (ref 0–41)
ANION GAP SERPL CALC-SCNC: 12 MMOL/L — SIGNIFICANT CHANGE UP (ref 7–14)
APTT BLD: 28.2 SEC — SIGNIFICANT CHANGE UP (ref 27–39.2)
AST SERPL-CCNC: 23 U/L — SIGNIFICANT CHANGE UP (ref 0–41)
BASOPHILS # BLD AUTO: 0.01 K/UL — SIGNIFICANT CHANGE UP (ref 0–0.2)
BASOPHILS # BLD AUTO: 0.03 K/UL — SIGNIFICANT CHANGE UP (ref 0–0.2)
BASOPHILS NFR BLD AUTO: 0.1 % — SIGNIFICANT CHANGE UP (ref 0–1)
BASOPHILS NFR BLD AUTO: 0.2 % — SIGNIFICANT CHANGE UP (ref 0–1)
BILIRUB SERPL-MCNC: 0.5 MG/DL — SIGNIFICANT CHANGE UP (ref 0.2–1.2)
BUN SERPL-MCNC: <3 MG/DL — LOW (ref 10–20)
CALCIUM SERPL-MCNC: 8.7 MG/DL — SIGNIFICANT CHANGE UP (ref 8.4–10.4)
CHLORIDE SERPL-SCNC: 104 MMOL/L — SIGNIFICANT CHANGE UP (ref 98–110)
CO2 SERPL-SCNC: 20 MMOL/L — SIGNIFICANT CHANGE UP (ref 17–32)
CREAT SERPL-MCNC: <0.5 MG/DL — LOW (ref 0.7–1.5)
EGFR: 133 ML/MIN/1.73M2 — SIGNIFICANT CHANGE UP
EOSINOPHIL # BLD AUTO: 0.01 K/UL — SIGNIFICANT CHANGE UP (ref 0–0.7)
EOSINOPHIL # BLD AUTO: 0.04 K/UL — SIGNIFICANT CHANGE UP (ref 0–0.7)
EOSINOPHIL NFR BLD AUTO: 0.1 % — SIGNIFICANT CHANGE UP (ref 0–8)
EOSINOPHIL NFR BLD AUTO: 0.3 % — SIGNIFICANT CHANGE UP (ref 0–8)
FIBRINOGEN PPP-MCNC: 624 MG/DL — HIGH (ref 204.4–570.6)
GLUCOSE BLDC GLUCOMTR-MCNC: 155 MG/DL — HIGH (ref 70–99)
GLUCOSE SERPL-MCNC: 128 MG/DL — HIGH (ref 70–99)
GROUP B BETA STREP DNA (PCR): SIGNIFICANT CHANGE UP
HBV SURFACE AG SERPL QL IA: SIGNIFICANT CHANGE UP
HCT VFR BLD CALC: 25 % — LOW (ref 37–47)
HCT VFR BLD CALC: 29.8 % — LOW (ref 37–47)
HCV AB S/CO SERPL IA: 0.03 COI — SIGNIFICANT CHANGE UP
HCV AB SERPL-IMP: SIGNIFICANT CHANGE UP
HGB BLD-MCNC: 8.2 G/DL — LOW (ref 12–16)
HGB BLD-MCNC: 9.9 G/DL — LOW (ref 12–16)
HIV 1 & 2 AB SERPL IA.RAPID: SIGNIFICANT CHANGE UP
IMM GRANULOCYTES NFR BLD AUTO: 0.4 % — HIGH (ref 0.1–0.3)
IMM GRANULOCYTES NFR BLD AUTO: 0.6 % — HIGH (ref 0.1–0.3)
INR BLD: 1.01 RATIO — SIGNIFICANT CHANGE UP (ref 0.65–1.3)
LDH SERPL L TO P-CCNC: 158 — SIGNIFICANT CHANGE UP (ref 50–242)
LYMPHOCYTES # BLD AUTO: 1.55 K/UL — SIGNIFICANT CHANGE UP (ref 1.2–3.4)
LYMPHOCYTES # BLD AUTO: 1.95 K/UL — SIGNIFICANT CHANGE UP (ref 1.2–3.4)
LYMPHOCYTES # BLD AUTO: 12.4 % — LOW (ref 20.5–51.1)
LYMPHOCYTES # BLD AUTO: 17 % — LOW (ref 20.5–51.1)
MCHC RBC-ENTMCNC: 29.2 PG — SIGNIFICANT CHANGE UP (ref 27–31)
MCHC RBC-ENTMCNC: 29.5 PG — SIGNIFICANT CHANGE UP (ref 27–31)
MCHC RBC-ENTMCNC: 32.8 G/DL — SIGNIFICANT CHANGE UP (ref 32–37)
MCHC RBC-ENTMCNC: 33.2 G/DL — SIGNIFICANT CHANGE UP (ref 32–37)
MCV RBC AUTO: 88.7 FL — SIGNIFICANT CHANGE UP (ref 81–99)
MCV RBC AUTO: 89 FL — SIGNIFICANT CHANGE UP (ref 81–99)
MONOCYTES # BLD AUTO: 0.7 K/UL — HIGH (ref 0.1–0.6)
MONOCYTES # BLD AUTO: 0.85 K/UL — HIGH (ref 0.1–0.6)
MONOCYTES NFR BLD AUTO: 6.1 % — SIGNIFICANT CHANGE UP (ref 1.7–9.3)
MONOCYTES NFR BLD AUTO: 6.8 % — SIGNIFICANT CHANGE UP (ref 1.7–9.3)
NEUTROPHILS # BLD AUTO: 8.69 K/UL — HIGH (ref 1.4–6.5)
NEUTROPHILS # BLD AUTO: 9.97 K/UL — HIGH (ref 1.4–6.5)
NEUTROPHILS NFR BLD AUTO: 76.1 % — HIGH (ref 42.2–75.2)
NEUTROPHILS NFR BLD AUTO: 79.9 % — HIGH (ref 42.2–75.2)
NRBC # BLD: 0 /100 WBCS — SIGNIFICANT CHANGE UP (ref 0–0)
NRBC # BLD: 0 /100 WBCS — SIGNIFICANT CHANGE UP (ref 0–0)
PLATELET # BLD AUTO: 284 K/UL — SIGNIFICANT CHANGE UP (ref 130–400)
PLATELET # BLD AUTO: 320 K/UL — SIGNIFICANT CHANGE UP (ref 130–400)
POTASSIUM SERPL-MCNC: 4.1 MMOL/L — SIGNIFICANT CHANGE UP (ref 3.5–5)
POTASSIUM SERPL-SCNC: 4.1 MMOL/L — SIGNIFICANT CHANGE UP (ref 3.5–5)
PROT SERPL-MCNC: 4.6 G/DL — LOW (ref 6–8)
PROTHROM AB SERPL-ACNC: 11.5 SEC — SIGNIFICANT CHANGE UP (ref 9.95–12.87)
RBC # BLD: 2.81 M/UL — LOW (ref 4.2–5.4)
RBC # BLD: 3.36 M/UL — LOW (ref 4.2–5.4)
RBC # FLD: 13.4 % — SIGNIFICANT CHANGE UP (ref 11.5–14.5)
RBC # FLD: 13.4 % — SIGNIFICANT CHANGE UP (ref 11.5–14.5)
SODIUM SERPL-SCNC: 136 MMOL/L — SIGNIFICANT CHANGE UP (ref 135–146)
SOURCE GROUP B STREP: SIGNIFICANT CHANGE UP
URATE SERPL-MCNC: 3.1 MG/DL — SIGNIFICANT CHANGE UP (ref 2.5–7)
WBC # BLD: 11.44 K/UL — HIGH (ref 4.8–10.8)
WBC # BLD: 12.49 K/UL — HIGH (ref 4.8–10.8)
WBC # FLD AUTO: 11.44 K/UL — HIGH (ref 4.8–10.8)
WBC # FLD AUTO: 12.49 K/UL — HIGH (ref 4.8–10.8)

## 2022-12-03 PROCEDURE — 88307 TISSUE EXAM BY PATHOLOGIST: CPT | Mod: 26

## 2022-12-03 PROCEDURE — 59515 CESAREAN DELIVERY: CPT | Mod: U7

## 2022-12-03 PROCEDURE — 93010 ELECTROCARDIOGRAM REPORT: CPT

## 2022-12-03 RX ORDER — METHYLENE BLUE 65 MG
100 TABLET ORAL ONCE
Refills: 0 | Status: DISCONTINUED | OUTPATIENT
Start: 2022-12-03 | End: 2022-12-03

## 2022-12-03 RX ORDER — LANOLIN
1 OINTMENT (GRAM) TOPICAL EVERY 6 HOURS
Refills: 0 | Status: DISCONTINUED | OUTPATIENT
Start: 2022-12-03 | End: 2022-12-06

## 2022-12-03 RX ORDER — ACETAMINOPHEN 500 MG
975 TABLET ORAL ONCE
Refills: 0 | Status: COMPLETED | OUTPATIENT
Start: 2022-12-03 | End: 2022-12-03

## 2022-12-03 RX ORDER — SIMETHICONE 80 MG/1
80 TABLET, CHEWABLE ORAL EVERY 4 HOURS
Refills: 0 | Status: DISCONTINUED | OUTPATIENT
Start: 2022-12-03 | End: 2022-12-06

## 2022-12-03 RX ORDER — DIPHENHYDRAMINE HCL 50 MG
25 CAPSULE ORAL EVERY 6 HOURS
Refills: 0 | Status: DISCONTINUED | OUTPATIENT
Start: 2022-12-03 | End: 2022-12-06

## 2022-12-03 RX ORDER — AMPICILLIN TRIHYDRATE 250 MG
CAPSULE ORAL
Refills: 0 | Status: DISCONTINUED | OUTPATIENT
Start: 2022-12-03 | End: 2022-12-04

## 2022-12-03 RX ORDER — LEVOTHYROXINE SODIUM 125 MCG
50 TABLET ORAL DAILY
Refills: 0 | Status: DISCONTINUED | OUTPATIENT
Start: 2022-12-03 | End: 2022-12-06

## 2022-12-03 RX ORDER — AMPICILLIN TRIHYDRATE 250 MG
2 CAPSULE ORAL ONCE
Refills: 0 | Status: COMPLETED | OUTPATIENT
Start: 2022-12-03 | End: 2022-12-03

## 2022-12-03 RX ORDER — IBUPROFEN 200 MG
600 TABLET ORAL EVERY 6 HOURS
Refills: 0 | Status: COMPLETED | OUTPATIENT
Start: 2022-12-03 | End: 2023-11-01

## 2022-12-03 RX ORDER — GENTAMICIN SULFATE 40 MG/ML
80 VIAL (ML) INJECTION EVERY 8 HOURS
Refills: 0 | Status: DISCONTINUED | OUTPATIENT
Start: 2022-12-03 | End: 2022-12-04

## 2022-12-03 RX ORDER — AMPICILLIN TRIHYDRATE 250 MG
2 CAPSULE ORAL EVERY 6 HOURS
Refills: 0 | Status: DISCONTINUED | OUTPATIENT
Start: 2022-12-03 | End: 2022-12-04

## 2022-12-03 RX ORDER — SODIUM CHLORIDE 9 MG/ML
500 INJECTION, SOLUTION INTRAVENOUS
Refills: 0 | Status: DISCONTINUED | OUTPATIENT
Start: 2022-12-03 | End: 2022-12-06

## 2022-12-03 RX ORDER — DIPHENHYDRAMINE HCL 50 MG
25 CAPSULE ORAL EVERY 6 HOURS
Refills: 0 | Status: COMPLETED | OUTPATIENT
Start: 2022-12-03 | End: 2023-11-01

## 2022-12-03 RX ORDER — OXYTOCIN 10 UNIT/ML
333.33 VIAL (ML) INJECTION
Qty: 20 | Refills: 0 | Status: DISCONTINUED | OUTPATIENT
Start: 2022-12-03 | End: 2022-12-06

## 2022-12-03 RX ORDER — KETOROLAC TROMETHAMINE 30 MG/ML
30 SYRINGE (ML) INJECTION EVERY 6 HOURS
Refills: 0 | Status: DISCONTINUED | OUTPATIENT
Start: 2022-12-03 | End: 2022-12-04

## 2022-12-03 RX ORDER — ACETAMINOPHEN 500 MG
975 TABLET ORAL
Refills: 0 | Status: DISCONTINUED | OUTPATIENT
Start: 2022-12-03 | End: 2022-12-06

## 2022-12-03 RX ORDER — OXYCODONE HYDROCHLORIDE 5 MG/1
5 TABLET ORAL ONCE
Refills: 0 | Status: DISCONTINUED | OUTPATIENT
Start: 2022-12-03 | End: 2022-12-06

## 2022-12-03 RX ORDER — OXYCODONE HYDROCHLORIDE 5 MG/1
5 TABLET ORAL
Refills: 0 | Status: DISCONTINUED | OUTPATIENT
Start: 2022-12-03 | End: 2022-12-06

## 2022-12-03 RX ORDER — SODIUM CHLORIDE 9 MG/ML
1000 INJECTION, SOLUTION INTRAVENOUS
Refills: 0 | Status: DISCONTINUED | OUTPATIENT
Start: 2022-12-03 | End: 2022-12-06

## 2022-12-03 RX ORDER — TETANUS TOXOID, REDUCED DIPHTHERIA TOXOID AND ACELLULAR PERTUSSIS VACCINE, ADSORBED 5; 2.5; 8; 8; 2.5 [IU]/.5ML; [IU]/.5ML; UG/.5ML; UG/.5ML; UG/.5ML
0.5 SUSPENSION INTRAMUSCULAR ONCE
Refills: 0 | Status: DISCONTINUED | OUTPATIENT
Start: 2022-12-03 | End: 2022-12-06

## 2022-12-03 RX ORDER — MAGNESIUM HYDROXIDE 400 MG/1
30 TABLET, CHEWABLE ORAL
Refills: 0 | Status: DISCONTINUED | OUTPATIENT
Start: 2022-12-03 | End: 2022-12-06

## 2022-12-03 RX ADMIN — Medication 204 MILLIGRAM(S): at 13:24

## 2022-12-03 RX ADMIN — Medication 975 MILLIGRAM(S): at 12:25

## 2022-12-03 RX ADMIN — SODIUM CHLORIDE 125 MILLILITER(S): 9 INJECTION, SOLUTION INTRAVENOUS at 06:49

## 2022-12-03 RX ADMIN — Medication 100 MILLIGRAM(S): at 13:24

## 2022-12-03 RX ADMIN — SIMETHICONE 80 MILLIGRAM(S): 80 TABLET, CHEWABLE ORAL at 16:44

## 2022-12-03 RX ADMIN — Medication 25 MILLIGRAM(S): at 18:18

## 2022-12-03 RX ADMIN — Medication 975 MILLIGRAM(S): at 18:10

## 2022-12-03 RX ADMIN — Medication 30 MILLIGRAM(S): at 22:23

## 2022-12-03 RX ADMIN — Medication 975 MILLIGRAM(S): at 05:25

## 2022-12-03 RX ADMIN — Medication 50 MICROGRAM(S): at 05:55

## 2022-12-03 RX ADMIN — Medication 30 MILLIGRAM(S): at 21:53

## 2022-12-03 RX ADMIN — Medication 204 MILLIGRAM(S): at 21:53

## 2022-12-03 RX ADMIN — Medication 204 MILLIGRAM(S): at 07:20

## 2022-12-03 RX ADMIN — Medication 30 MILLIGRAM(S): at 16:50

## 2022-12-03 RX ADMIN — Medication 30 MILLIGRAM(S): at 10:29

## 2022-12-03 RX ADMIN — Medication 30 MILLIGRAM(S): at 09:16

## 2022-12-03 RX ADMIN — Medication 100 MILLIGRAM(S): at 06:32

## 2022-12-03 RX ADMIN — Medication 200 GRAM(S): at 13:23

## 2022-12-03 RX ADMIN — Medication 975 MILLIGRAM(S): at 17:08

## 2022-12-03 RX ADMIN — Medication 100 MILLIGRAM(S): at 21:53

## 2022-12-03 RX ADMIN — Medication 30 MILLIGRAM(S): at 16:44

## 2022-12-03 RX ADMIN — Medication 975 MILLIGRAM(S): at 13:28

## 2022-12-03 RX ADMIN — Medication 200 GRAM(S): at 18:19

## 2022-12-03 RX ADMIN — Medication 200 GRAM(S): at 05:52

## 2022-12-03 RX ADMIN — AZITHROMYCIN 255 MILLIGRAM(S): 500 TABLET, FILM COATED ORAL at 00:08

## 2022-12-03 RX ADMIN — SODIUM CHLORIDE 500 MILLILITER(S): 9 INJECTION, SOLUTION INTRAVENOUS at 06:50

## 2022-12-03 NOTE — BRIEF OPERATIVE NOTE - OPERATION/FINDINGS
Pfannenstiel incision, low transverse uterine incision, viable male infant delivered in vertex position APGARs 9/9. Lower uterine segment found to be very thin and shredded. Hysterotomy closed with many imbricating stitches. Methylene blue used in OR, no extravasation noted. Low suspicion for bladder injury.  Do not recommend TOLAC with next pregnancy. Pfannenstiel incision, low transverse uterine incision, viable male infant delivered in vertex position APGARs 9/9. Lower uterine segment found to be very thin and shredded. Hysterotomy closed with many imbricating stitches. Methylene blue used in OR, no extravasation noted. Low suspicion for bladder injury.  Do not recommend TOLAC with next pregnancy. Hemabatex1 and TXAx1 given intraoperatively. Pfannenstiel incision, bladder flap for low transverse uterine incision, viable male infant delivered in vertex position APGARs 9/9. Lower uterine segment found to be very thin ad friable. Hysterotomy closed with running locked stitch x 2; multiple figure-8s. Methylene blue used in OR to assess for bladder integrity, no extravasation noted. Low suspicion for bladder injury.  Do not recommend TOLAC with next pregnancy. Hemabate x1 and TXAx1 given intraoperatively for intermittent atony. Sue powder used for added hemostasis.

## 2022-12-03 NOTE — PRE PROCEDURE NOTE - PRE PROCEDURE EVALUATION
Patient was re-examined and encouraged to push with minimal descent of fetal head.    VSS  GEN: NAD, AAOx3  pelvic: fully, -1 station, ruptured (clear)    LABS:  H/H: 12.2/35.76 plt 325  RH +    A/P:   33 y/o  @ 37w1d, GBS neg, GDMA2 poorly controlled, hypothyroid, admission for glycemic control, now undergoing IOL at term for GDMA2 + reported decreased FM, s/p cervidil, s/p epidural and AROM at 145pm with elevated blood pressure r/o gHTN PCR 0.1 . Patient noted to be fully dilated at 10pm   Counseled patient given fully dilated for 2 hrs in the context of GDM, ~EFW >3700g, concern for fetal macrosomia and maternal and fetal complications are high at this time.  Risk of shoulder dystocia, brachial plexus injury, maternal injury discussed. Patient endorsed understanding   Risk/benefits of  delivery discussed  Patient aware of increased risk of hemorrhage/infection/dvt possibility of bladder/bowel injury discussed. Possibility of blood transfusion and hysterectomy discussed.   Madhu interpretor used- patient endorsed understanding and willing to accept blood in emergencies. All questions answered.     - anesthesia aware  - surgical prep and ochoa  - dvt ppx  -surg ppx: ancef 2g, azithro  -  2 units ordered  - peds at delivery  - to OR  -PPH kit available

## 2022-12-03 NOTE — BRIEF OPERATIVE NOTE - NSICDXBRIEFPOSTOP_GEN_ALL_CORE_FT
POST-OP DIAGNOSIS:  Gestational diabetes 03-Dec-2022 04:06:26  Batsheva Salvador  Arrest of descent, delivered, current hospitalization 03-Dec-2022 04:06:34  Batsheva Salvador  37 weeks gestation of pregnancy 03-Dec-2022 04:07:09  Batsheva Salvador

## 2022-12-03 NOTE — OB PROVIDER DELIVERY SUMMARY - NSSELHIDDEN_OBGYN_ALL_OB_FT
[[email or web address]_ALL_OB_FT:[id number]],[NS_DeliveryRN_OBGYN_ALL_OB_FT:[id number]]

## 2022-12-03 NOTE — BRIEF OPERATIVE NOTE - NSICDXBRIEFPREOP_GEN_ALL_CORE_FT
PRE-OP DIAGNOSIS:  Arrest of descent, delivered, current hospitalization 03-Dec-2022 04:05:44  Batsheva Salvador  Gestational diabetes 03-Dec-2022 04:06:11  Batsheva Salvador  37 weeks gestation of pregnancy 03-Dec-2022 04:06:51  Batsheva Salvador

## 2022-12-03 NOTE — CHART NOTE - NSCHARTNOTEFT_GEN_A_CORE
PACU ANESTHESIA ADMISSION NOTE      Procedure: Primary  section      Post op diagnosis:  Gestational diabetes    Arrest of descent, delivered, current hospitalization    37 weeks gestation of pregnancy        __x__  Patent Airway    __x__  Full return of protective reflexes    __x__  Full recovery from anesthesia / back to baseline status    Vitals:  T(C): 38.1 (22 @ 05:15), Max: 38.1 (22 @ 05:15)  HR: 131 (22 @ 05:31) (89 - 144)  BP: 121/69 (22 @ 05:24) (97/69 - 205/74)  RR: 18 (22 @ 04:44) (18 - 18)  SpO2: 98% (22 @ 05:31) (92% - 100%)    Mental Status:  __x__ Awake   ___x__ Alert   _____ Drowsy   _____ Sedated    Nausea/Vomiting:  __x__ NO  ______Yes,   See Post - Op Orders          Pain Scale (0-10):  _____    Treatment: ____ None    __x__ See Post - Op/PCA Orders    Post - Operative Fluids:   ____ Oral   __x__ See Post - Op Orders    Plan: Discharge:   ____Home       _____Floor     _____Critical Care    _____  Other:_________________    Comments: no anesthesia complication. Significant for bleeding/atony last Hbg 11   PACU Vital signs: HR:  111          BP:    119    /   69       RR:       19      O2 Sat:99       %     Temp98.1    LR 2800 UO >400 EBL 1200  Hemabate, TXA,   Patient now in PACU with elevated BP OB team aware
PGY 1 Note    Patient seen at bedside for BPP. Doing well, byron irregularly all day.      BSS: vtx, post placenta, MVP 6.93cm, BPP 8/8   SVE: 0/0/-3     Dr. Mckeon aware.
PGY 4 NST Note      EFM: 120bpm/mod radha/+accels, one variable decel  [place]: occasional    Reactive NST    MFM attending to be made aware.
PGY 4 NST Note    EFM: 130bpm/mod radha/+accels, no decels  [place]: none    Reactive NST    Dr. Mcmullen to be made aware.
PGY 4 NST Note    EFM: 135bpm/mod radha/+accels, no decels  [place]: occasional    Reactive NST    MFM attending to be made aware.
PGY3 Note    MFM procedure note    Non stress test    Date:  11/29/2022  Start Time: 1015  End Time: 1100    Baseline: 135  beats per minute  Variability:  moderate   Accelerations: positive  Decelerations: negative    Tocometer: irregular (non-painful per patient)    Reactive NST    MFM to be made aware.
PGY 3 MFM procedure note    Non stress test    Date:  12/1/22  Start: 1021  End: 1057    Baseline:   125 beats per minute  Variability:  moderate  Accelerations: positive  Decelerations: negative    Tocometer: q5mins    MFM to be made aware

## 2022-12-03 NOTE — CHART NOTE - NSCHARTNOTESELECT_GEN_ALL_CORE
NST/Event Note
Event Note
NST/Event Note
Off Service Note
Transfer Note

## 2022-12-03 NOTE — PROGRESS NOTE ADULT - SUBJECTIVE AND OBJECTIVE BOX
PGY1 Note:  Section    POD#0. Pt seen and evaluated at bedside. Pain well controlled. Denies dizziness/lightheadedness/CP/SOB/palpitations. Denies fever, chills, nausea/vomiting, diarrhea, dysuria, LE pain.     Ambulating: No  Voiding: ochoa in place  Flatus: no  Bowel movements: no  Breast or bottle feeding:  both  Diet: only drinking small sips of water.    Physical Exam  Vital Signs Last 24 Hrs  T(C): 36.2 (03 Dec 2022 08:30), Max: 38.1 (03 Dec 2022 05:15)  T(F): 97.2 (03 Dec 2022 08:30), Max: 100.6 (03 Dec 2022 05:15)  HR: 119 (03 Dec 2022 08:30) (72 - 144)  BP: 115/56 (03 Dec 2022 08:30) (49/30 - 205/74)  RR: 18 (03 Dec 2022 08:30) (18 - 18)  SpO2: 99% (03 Dec 2022 07:56) (92% - 100%)      Gen: AAOx3, NAD  Heart: RRR, S1S2+  Lungs: CTA B/L, no r/r/w   Fundus: firm, below umbilicus   Wound: abdominal pressure dressing in place, no drainage.   Abd: Soft, nontender, nondistended, BS+  Lochia: minimal   Ext: No calf tenderness, no swelling  UO: (1133-0278) 70cc (5420-8335) 50cc  Labs:                        9.9    12.49 )-----------( 320      ( 03 Dec 2022 06:16 )             29.8                         12.2   10.61 )-----------( 325      ( 02 Dec 2022 15:26 )             35.7        MEDICATIONS  (STANDING):  acetaminophen     Tablet .. 975 milliGRAM(s) Oral <User Schedule>  ampicillin  IVPB      ampicillin  IVPB 2 Gram(s) IV Intermittent every 6 hours  clindamycin IVPB      clindamycin IVPB 900 milliGRAM(s) IV Intermittent every 8 hours  diphtheria/tetanus/pertussis (acellular) Vaccine (Adacel) 0.5 milliLiter(s) IntraMuscular once  gentamicin   IVPB 80 milliGRAM(s) IV Intermittent every 8 hours  ibuprofen  Tablet. 600 milliGRAM(s) Oral every 6 hours  ketorolac   Injectable 30 milliGRAM(s) IV Push every 6 hours  lactated ringers. 1000 milliLiter(s) (125 mL/Hr) IV Continuous <Continuous>  lactated ringers. 500 milliLiter(s) (500 mL/Hr) IV Continuous <Continuous>  levothyroxine 50 MICROGram(s) Oral daily  oxytocin Infusion 333.333 milliUNIT(s)/Min (1000 mL/Hr) IV Continuous <Continuous>    MEDICATIONS  (PRN):  diphenhydrAMINE 25 milliGRAM(s) Oral every 6 hours PRN Pruritus  lanolin Ointment 1 Application(s) Topical every 6 hours PRN Sore Nipples  magnesium hydroxide Suspension 30 milliLiter(s) Oral two times a day PRN Constipation  oxyCODONE    IR 5 milliGRAM(s) Oral every 3 hours PRN Moderate to Severe Pain (4-10)  oxyCODONE    IR 5 milliGRAM(s) Oral once PRN Moderate to Severe Pain (4-10)  simethicone 80 milliGRAM(s) Chew every 4 hours PRN Gas

## 2022-12-03 NOTE — OB RN INTRAOPERATIVE NOTE - NSSELHIDDEN_OBGYN_ALL_OB_FT
[[email or web address]_ALL_OB_FT:[id number]],[NS_DeliveryRN_OBGYN_ALL_OB_FT:[id number]]

## 2022-12-03 NOTE — PROGRESS NOTE ADULT - ASSESSMENT
A/P:31 yo now P3, s/p c/s for arrest of decent, pod#0, on triples for 24hr,     - pain management with PO pain meds   - monitor vitals/bleeding   - monitor urine output  - encourage incentive spirometry   - ambulation/PO hydration  - advance diet as tolerated   - simethicone  - SCDs/lovenox for DVT prophylaxis   - f/u 1600 labs   - routine postop care     Will inform Dr. Carmen and Dr. Gonsales

## 2022-12-04 ENCOUNTER — TRANSCRIPTION ENCOUNTER (OUTPATIENT)
Age: 32
End: 2022-12-04

## 2022-12-04 LAB
APTT BLD: 32.3 SEC — SIGNIFICANT CHANGE UP (ref 27–39.2)
BASOPHILS # BLD AUTO: 0.02 K/UL — SIGNIFICANT CHANGE UP (ref 0–0.2)
BASOPHILS NFR BLD AUTO: 0.1 % — SIGNIFICANT CHANGE UP (ref 0–1)
EOSINOPHIL # BLD AUTO: 0.16 K/UL — SIGNIFICANT CHANGE UP (ref 0–0.7)
EOSINOPHIL NFR BLD AUTO: 1.2 % — SIGNIFICANT CHANGE UP (ref 0–8)
HCT VFR BLD CALC: 26.5 % — LOW (ref 37–47)
HGB BLD-MCNC: 9 G/DL — LOW (ref 12–16)
IMM GRANULOCYTES NFR BLD AUTO: 1 % — HIGH (ref 0.1–0.3)
INR BLD: 1 RATIO — SIGNIFICANT CHANGE UP (ref 0.65–1.3)
LYMPHOCYTES # BLD AUTO: 1.89 K/UL — SIGNIFICANT CHANGE UP (ref 1.2–3.4)
LYMPHOCYTES # BLD AUTO: 13.9 % — LOW (ref 20.5–51.1)
MCHC RBC-ENTMCNC: 29.9 PG — SIGNIFICANT CHANGE UP (ref 27–31)
MCHC RBC-ENTMCNC: 34 G/DL — SIGNIFICANT CHANGE UP (ref 32–37)
MCV RBC AUTO: 88 FL — SIGNIFICANT CHANGE UP (ref 81–99)
MONOCYTES # BLD AUTO: 1.04 K/UL — HIGH (ref 0.1–0.6)
MONOCYTES NFR BLD AUTO: 7.7 % — SIGNIFICANT CHANGE UP (ref 1.7–9.3)
NEUTROPHILS # BLD AUTO: 10.32 K/UL — HIGH (ref 1.4–6.5)
NEUTROPHILS NFR BLD AUTO: 76.1 % — HIGH (ref 42.2–75.2)
NRBC # BLD: 0 /100 WBCS — SIGNIFICANT CHANGE UP (ref 0–0)
PLATELET # BLD AUTO: 286 K/UL — SIGNIFICANT CHANGE UP (ref 130–400)
PROTHROM AB SERPL-ACNC: 11.4 SEC — SIGNIFICANT CHANGE UP (ref 9.95–12.87)
RBC # BLD: 3.01 M/UL — LOW (ref 4.2–5.4)
RBC # FLD: 13.6 % — SIGNIFICANT CHANGE UP (ref 11.5–14.5)
WBC # BLD: 13.56 K/UL — HIGH (ref 4.8–10.8)
WBC # FLD AUTO: 13.56 K/UL — HIGH (ref 4.8–10.8)

## 2022-12-04 PROCEDURE — 99231 SBSQ HOSP IP/OBS SF/LOW 25: CPT

## 2022-12-04 RX ORDER — ENOXAPARIN SODIUM 100 MG/ML
40 INJECTION SUBCUTANEOUS EVERY 24 HOURS
Refills: 0 | Status: DISCONTINUED | OUTPATIENT
Start: 2022-12-04 | End: 2022-12-06

## 2022-12-04 RX ORDER — IBUPROFEN 200 MG
600 TABLET ORAL EVERY 6 HOURS
Refills: 0 | Status: DISCONTINUED | OUTPATIENT
Start: 2022-12-04 | End: 2022-12-06

## 2022-12-04 RX ORDER — GUAIFENESIN/DEXTROMETHORPHAN 600MG-30MG
10 TABLET, EXTENDED RELEASE 12 HR ORAL EVERY 6 HOURS
Refills: 0 | Status: DISCONTINUED | OUTPATIENT
Start: 2022-12-04 | End: 2022-12-06

## 2022-12-04 RX ADMIN — Medication 10 MILLILITER(S): at 23:15

## 2022-12-04 RX ADMIN — Medication 975 MILLIGRAM(S): at 06:45

## 2022-12-04 RX ADMIN — Medication 975 MILLIGRAM(S): at 00:57

## 2022-12-04 RX ADMIN — Medication 975 MILLIGRAM(S): at 12:59

## 2022-12-04 RX ADMIN — Medication 100 MILLIGRAM(S): at 13:37

## 2022-12-04 RX ADMIN — Medication 30 MILLIGRAM(S): at 05:31

## 2022-12-04 RX ADMIN — Medication 200 GRAM(S): at 00:30

## 2022-12-04 RX ADMIN — Medication 975 MILLIGRAM(S): at 00:27

## 2022-12-04 RX ADMIN — Medication 600 MILLIGRAM(S): at 23:45

## 2022-12-04 RX ADMIN — Medication 100 MILLIGRAM(S): at 05:30

## 2022-12-04 RX ADMIN — Medication 50 MICROGRAM(S): at 05:30

## 2022-12-04 RX ADMIN — Medication 204 MILLIGRAM(S): at 15:00

## 2022-12-04 RX ADMIN — Medication 204 MILLIGRAM(S): at 06:46

## 2022-12-04 RX ADMIN — Medication 200 GRAM(S): at 06:46

## 2022-12-04 RX ADMIN — Medication 10 MILLILITER(S): at 17:45

## 2022-12-04 RX ADMIN — Medication 600 MILLIGRAM(S): at 23:15

## 2022-12-04 RX ADMIN — Medication 200 GRAM(S): at 13:30

## 2022-12-04 RX ADMIN — Medication 975 MILLIGRAM(S): at 18:29

## 2022-12-04 NOTE — PROGRESS NOTE ADULT - ASSESSMENT
A/P:33 yo now P3, s/p c/s for arrest of decent, pod#1, gHTN r/o preeclampsia ,chorio on triples for 24hr, recovering well    - pain management with PO pain meds   - monitor vitals/bleeding   - monitor urine output  - encourage incentive spirometry   - ambulation/PO hydration  - advance diet as tolerated   - simethicone  - SCDs/lovenox for DVT prophylaxis   - f/u AM labs  - routine postop care     Will inform Dr. Rahman and Dr. Mckeon to be made aware

## 2022-12-04 NOTE — DISCHARGE NOTE NURSING/CASE MANAGEMENT/SOCIAL WORK - NSDCPEFALRISK_GEN_ALL_CORE
For information on Fall & Injury Prevention, visit: [email or web address] OR  [email or web address] OR  https://www.cdc.gov/steadi/patient.html

## 2022-12-04 NOTE — PROGRESS NOTE ADULT - SUBJECTIVE AND OBJECTIVE BOX
PGY1 note  Chief Complaint: Post  section    Patient seen and examined. Pain well controlled at this time. No complaints at this time. Denies fever, chills, nausea, vomiting, chest pain, shortness of breath, severe abdominal pain, heavy vaginal bleeding, headache, scotomata, and RUQ pain    Patient is not ambulating.   Passing flatus, Denies bowel movement.   Diet: Regular, tolerating PO  Voiding: ochoa removed TOV 1345      PAST MEDICAL & SURGICAL HISTORY:  Hypothyroid  No significant past surgical history    MEDICATIONS  (STANDING):  acetaminophen     Tablet .. 975 milliGRAM(s) Oral <User Schedule>  ampicillin  IVPB      ampicillin  IVPB 2 Gram(s) IV Intermittent every 6 hours  clindamycin IVPB 900 milliGRAM(s) IV Intermittent every 8 hours  clindamycin IVPB      diphtheria/tetanus/pertussis (acellular) Vaccine (Adacel) 0.5 milliLiter(s) IntraMuscular once  enoxaparin Injectable 40 milliGRAM(s) SubCutaneous every 24 hours  gentamicin   IVPB 80 milliGRAM(s) IV Intermittent every 8 hours  ibuprofen  Tablet. 600 milliGRAM(s) Oral every 6 hours  lactated ringers. 1000 milliLiter(s) (125 mL/Hr) IV Continuous <Continuous>  lactated ringers. 500 milliLiter(s) (500 mL/Hr) IV Continuous <Continuous>  levothyroxine 50 MICROGram(s) Oral daily  oxytocin Infusion 333.333 milliUNIT(s)/Min (1000 mL/Hr) IV Continuous <Continuous>    Physical Exam  Vital Signs Last 24 Hrs  T(F): 98.4 (04 Dec 2022 11:33), Max: 100.7 (03 Dec 2022 16:30)  HR: 111 (04 Dec 2022 11:33) (95 - 123)  BP: 126/74 (04 Dec 2022 11:33) (101/54 - 126/74)  RR: 18 (04 Dec 2022 11:33) (18 - 20)    Physical exam:  General - AAOx3, NAD  Heart - S1S2, RRR  Lungs - CTA BL  Abdomen:  - Soft, appropriately tender, mildly distended, BS+. Clean, dry, intact BARBARA dressing in place over pfannenstiel skin incision.  - Fundus firm, appropriately tender, below the umbilicus  - No rebound or guarding  Pelvis/Vagina - Normal Lochia  Extremities - No calf tenderness, no swelling    Labs:                        9.0    13.56 )-----------( 286      ( 04 Dec 2022 07:44 )             26.5                         8.2    11.44 )-----------( 284      ( 03 Dec 2022 18:22 )             25.0     Antibody Screen: NEG (22 @ 06:41)      Antibody Screen: NEG (22 @ 06:41)

## 2022-12-04 NOTE — DISCHARGE NOTE NURSING/CASE MANAGEMENT/SOCIAL WORK - PATIENT PORTAL LINK FT
You can access the FollowMyHealth Patient Portal offered by [facility] by registering at the following website: [email or web address]. By joining [employer]’s FollowMyHealth portal, you will also be able to view your health information using other applications (apps) compatible with our system.

## 2022-12-05 ENCOUNTER — APPOINTMENT (OUTPATIENT)
Dept: ANTEPARTUM | Facility: CLINIC | Age: 32
End: 2022-12-05

## 2022-12-05 ENCOUNTER — TRANSCRIPTION ENCOUNTER (OUTPATIENT)
Age: 32
End: 2022-12-05

## 2022-12-05 PROCEDURE — 99231 SBSQ HOSP IP/OBS SF/LOW 25: CPT

## 2022-12-05 RX ORDER — METFORMIN HYDROCHLORIDE 850 MG/1
1 TABLET ORAL
Qty: 0 | Refills: 0 | DISCHARGE

## 2022-12-05 RX ORDER — IBUPROFEN 200 MG
1 TABLET ORAL
Qty: 0 | Refills: 0 | DISCHARGE
Start: 2022-12-05

## 2022-12-05 RX ORDER — SIMETHICONE 80 MG/1
1 TABLET, CHEWABLE ORAL
Qty: 0 | Refills: 0 | DISCHARGE
Start: 2022-12-05

## 2022-12-05 RX ORDER — LEVOTHYROXINE SODIUM 125 MCG
1 TABLET ORAL
Qty: 0 | Refills: 0 | DISCHARGE

## 2022-12-05 RX ORDER — LEVOTHYROXINE SODIUM 125 MCG
1 TABLET ORAL
Qty: 0 | Refills: 0 | DISCHARGE
Start: 2022-12-05

## 2022-12-05 RX ORDER — ACETAMINOPHEN 500 MG
3 TABLET ORAL
Qty: 0 | Refills: 0 | DISCHARGE
Start: 2022-12-05

## 2022-12-05 RX ADMIN — Medication 975 MILLIGRAM(S): at 08:34

## 2022-12-05 RX ADMIN — Medication 600 MILLIGRAM(S): at 06:28

## 2022-12-05 RX ADMIN — Medication 50 MICROGRAM(S): at 06:27

## 2022-12-05 RX ADMIN — Medication 975 MILLIGRAM(S): at 16:51

## 2022-12-05 RX ADMIN — Medication 600 MILLIGRAM(S): at 13:00

## 2022-12-05 RX ADMIN — Medication 10 MILLILITER(S): at 13:02

## 2022-12-05 RX ADMIN — Medication 10 MILLILITER(S): at 23:28

## 2022-12-05 RX ADMIN — Medication 975 MILLIGRAM(S): at 16:21

## 2022-12-05 RX ADMIN — Medication 975 MILLIGRAM(S): at 09:04

## 2022-12-05 RX ADMIN — Medication 10 MILLILITER(S): at 06:27

## 2022-12-05 RX ADMIN — Medication 975 MILLIGRAM(S): at 21:14

## 2022-12-05 RX ADMIN — Medication 600 MILLIGRAM(S): at 13:30

## 2022-12-05 RX ADMIN — Medication 10 MILLILITER(S): at 18:38

## 2022-12-05 RX ADMIN — Medication 600 MILLIGRAM(S): at 23:28

## 2022-12-05 RX ADMIN — ENOXAPARIN SODIUM 40 MILLIGRAM(S): 100 INJECTION SUBCUTANEOUS at 13:01

## 2022-12-05 RX ADMIN — Medication 600 MILLIGRAM(S): at 18:37

## 2022-12-05 NOTE — PROGRESS NOTE ADULT - ATTENDING COMMENTS
POD2 s/p c/s for arrest of descent with non-reproducible right sided pain on exam. Cough has been present since before delivery. Will continue Robitussin. No supplemental oxygen needed. Will continue to follow. No acute distress

## 2022-12-05 NOTE — DISCHARGE NOTE OB - HOSPITAL COURSE
routine post partum care LTCS for arrest of dilation, GDMA2, chorioamnionitis, gHTN, stable at discharge

## 2022-12-05 NOTE — PROGRESS NOTE ADULT - ASSESSMENT
A/P:31 yo now P3, s/p c/s for arrest of decent, pod#2, gHTN r/o preeclampsia ,chorio s/p triples for 24 hrs, recovering well    - pain management with PO pain meds   - monitor vitals/bleeding   - monitor urine output  - encourage incentive spirometry   - ambulation/PO hydration  - advance diet as tolerated   - simethicone  - SCDs/lovenox for DVT prophylaxis   - Robitussin for cough   - routine postop care   Dr. Goddard and OB attending to be made aware.

## 2022-12-05 NOTE — DISCHARGE NOTE OB - PATIENT PORTAL LINK FT
You can access the FollowMyHealth Patient Portal offered by [facility] by registering at the following website: [email or web address]. By joining [employer]’s FollowMyHealth portal, you will also be able to view your health information using other applications (apps) compatible with our system.

## 2022-12-05 NOTE — DISCHARGE NOTE OB - MEDICATION SUMMARY - MEDICATIONS TO STOP TAKING
I will STOP taking the medications listed below when I get home from the hospital:    loratadine 10 mg oral tablet  -- 1 tab(s) by mouth once a day   -- May cause drowsiness.  Alcohol may intensify this effect.  Use care when operating dangerous machinery.  Obtain medical advice before taking any non-prescription drugs as some may affect the action of this medication.    Monistat 7 vaginal cream with applicator  -- 1 applicatorful intravaginally once a day   -- For vaginal use.    Diflucan 150 mg oral tablet  -- 1 tab(s) by mouth once a day   -- Do not take this drug if you are pregnant.  Finish all this medication unless otherwise directed by prescriber.    metFORMIN 1000 mg oral tablet  -- 1 tab(s) by mouth 2 times a day    Synthroid 100 mcg (0.1 mg) oral tablet  -- 1 tab(s) by mouth once a day

## 2022-12-05 NOTE — PROGRESS NOTE ADULT - SUBJECTIVE AND OBJECTIVE BOX
PGY1 Note:  Section    POD#2. Pt seen and evaluated at bedside. C/o right sided abdominal pain, worsen with movement, non-radiating.  Also c/o persistent non-productive cough, denies shortness of breath, chest pain.  Denies dizziness/lightheadedness/palpitations. Denies fever, chills, nausea/vomiting, diarrhea, dysuria, LE pain.     Ambulating: Yes  Voiding: Yes  Flatus: Yes  Bowel movements: Yes   Breast or bottle feeding: breast  Diet: tolerating regular diet     Physical Exam  Vital Signs Last 24 Hrs  T(C): 36.5 (05 Dec 2022 04:28), Max: 37.2 (04 Dec 2022 08:21)  T(F): 97.7 (05 Dec 2022 04:28), Max: 98.9 (04 Dec 2022 08:21)  HR: 86 (05 Dec 2022 04:28) (86 - 116)  BP: 110/60 (05 Dec 2022 04:28) (105/51 - 126/74)  RR: 16 (05 Dec 2022 04:28) (16 - 18)    Parameters below as of 05 Dec 2022 04:28  Patient On (Oxygen Delivery Method): room air      Gen: AAOx3, NAD  Heart: RRR, S1S2+  Lungs: CTA B/L, no r/r/w   Fundus: firm, below umbilicus   Wound: BARBARA dressing in place, no drainage.   Abd: Soft, nontender, nondistended, BS+  Lochia: minimal   Ext: No calf tenderness, no swelling    Labs:                        9.0    13.56 )-----------( 286      ( 04 Dec 2022 07:44 )             26.5                         8.2    11.44 )-----------( 284      ( 03 Dec 2022 18:22 )             25.0        MEDICATIONS  (STANDING):  acetaminophen     Tablet .. 975 milliGRAM(s) Oral <User Schedule>  diphtheria/tetanus/pertussis (acellular) Vaccine (Adacel) 0.5 milliLiter(s) IntraMuscular once  enoxaparin Injectable 40 milliGRAM(s) SubCutaneous every 24 hours  guaifenesin/dextromethorphan Oral Liquid 10 milliLiter(s) Oral every 6 hours  ibuprofen  Tablet. 600 milliGRAM(s) Oral every 6 hours  lactated ringers. 1000 milliLiter(s) (125 mL/Hr) IV Continuous <Continuous>  lactated ringers. 500 milliLiter(s) (500 mL/Hr) IV Continuous <Continuous>  levothyroxine 50 MICROGram(s) Oral daily  oxytocin Infusion 333.333 milliUNIT(s)/Min (1000 mL/Hr) IV Continuous <Continuous>    MEDICATIONS  (PRN):  diphenhydrAMINE 25 milliGRAM(s) Oral every 6 hours PRN Pruritus  lanolin Ointment 1 Application(s) Topical every 6 hours PRN Sore Nipples  magnesium hydroxide Suspension 30 milliLiter(s) Oral two times a day PRN Constipation  oxyCODONE    IR 5 milliGRAM(s) Oral every 3 hours PRN Moderate to Severe Pain (4-10)  oxyCODONE    IR 5 milliGRAM(s) Oral once PRN Moderate to Severe Pain (4-10)  simethicone 80 milliGRAM(s) Chew every 4 hours PRN Gas

## 2022-12-05 NOTE — DISCHARGE NOTE OB - CARE PROVIDER_API CALL
Katelyn Chiang)  OBOBINNA  [address] [place] [address]  Phone: [phone]  Fax: [phone]  Follow Up Time:

## 2022-12-05 NOTE — DISCHARGE NOTE OB - CARE PLAN
1 Principal Discharge DX:	 delivery delivered  Assessment and plan of treatment:	Nothing in the vagina for 6 weeks (no sex, no tampons, no douching). Avoid tub baths, you may shower.  If you have a fever of 100.4F or greater, severe vaginal bleeding, or severe abdominal pain, call your Ob/Gyn or come to the emergency department immediately.  Please follow up with your provider in 1 week for incision check, 6 weeks for postpartum visit.  Secondary Diagnosis:	Gestational diabetes  Assessment and plan of treatment:	Please complete a 2 hour glucose challenge test to test if your diabetes has gone away in 5-6 weeks

## 2022-12-05 NOTE — DISCHARGE NOTE OB - NS MD DC FALL RISK RISK
For information on Fall & Injury Prevention, visit: [email or web address] OR  [email or web address] OR  https://www.cdc.gov/steadi/patient.html

## 2022-12-05 NOTE — DISCHARGE NOTE OB - PLAN OF CARE
Nothing in the vagina for 6 weeks (no sex, no tampons, no douching). Avoid tub baths, you may shower.  If you have a fever of 100.4F or greater, severe vaginal bleeding, or severe abdominal pain, call your Ob/Gyn or come to the emergency department immediately.  Please follow up with your provider in 1 week for incision check, 6 weeks for postpartum visit. Please complete a 2 hour glucose challenge test to test if your diabetes has gone away in 5-6 weeks

## 2022-12-05 NOTE — DISCHARGE NOTE OB - MEDICATION SUMMARY - MEDICATIONS TO TAKE
I will START or STAY ON the medications listed below when I get home from the hospital:    acetaminophen 325 mg oral tablet  -- 3 tab(s) by mouth every 6 hours, As Needed  -- Indication: For pain    ibuprofen 600 mg oral tablet  -- 1 tab(s) by mouth every 6 hours, As Needed  -- Indication: For pain    simethicone 80 mg oral tablet, chewable  -- 1 tab(s) by mouth every 4 hours, As needed, Gas  -- Indication: For Gas    levothyroxine 50 mcg (0.05 mg) oral tablet  -- 1 tab(s) by mouth once a day  -- Indication: For hypothyroidism

## 2022-12-06 VITALS
DIASTOLIC BLOOD PRESSURE: 65 MMHG | SYSTOLIC BLOOD PRESSURE: 103 MMHG | HEART RATE: 81 BPM | TEMPERATURE: 97 F | RESPIRATION RATE: 18 BRPM

## 2022-12-06 PROCEDURE — 99238 HOSP IP/OBS DSCHRG MGMT 30/<: CPT

## 2022-12-06 PROCEDURE — 59514 CESAREAN DELIVERY ONLY: CPT | Mod: U7

## 2022-12-06 RX ORDER — IBUPROFEN 200 MG
1 TABLET ORAL
Qty: 56 | Refills: 0
Start: 2022-12-06 | End: 2022-12-19

## 2022-12-06 RX ORDER — SIMETHICONE 80 MG/1
1 TABLET, CHEWABLE ORAL
Qty: 180 | Refills: 0
Start: 2022-12-06 | End: 2023-01-04

## 2022-12-06 RX ORDER — ACETAMINOPHEN 500 MG
3 TABLET ORAL
Qty: 360 | Refills: 0
Start: 2022-12-06 | End: 2023-01-04

## 2022-12-06 RX ADMIN — ENOXAPARIN SODIUM 40 MILLIGRAM(S): 100 INJECTION SUBCUTANEOUS at 11:02

## 2022-12-06 RX ADMIN — Medication 600 MILLIGRAM(S): at 06:00

## 2022-12-06 RX ADMIN — Medication 50 MICROGRAM(S): at 06:00

## 2022-12-06 RX ADMIN — Medication 10 MILLILITER(S): at 11:28

## 2022-12-06 RX ADMIN — Medication 975 MILLIGRAM(S): at 09:45

## 2022-12-06 RX ADMIN — Medication 975 MILLIGRAM(S): at 09:15

## 2022-12-06 RX ADMIN — Medication 600 MILLIGRAM(S): at 11:28

## 2022-12-06 RX ADMIN — Medication 10 MILLILITER(S): at 06:01

## 2022-12-06 NOTE — PROGRESS NOTE ADULT - ASSESSMENT
A/P:31 yo now P3, s/p c/s for arrest of decent, pod#3,  gHTN r/o preeclampsia ,chorio s/p triples for 24 hrs, recovering well    - pain management with PO pain meds   - monitor vitals/bleeding   - monitor urine output  - encourage incentive spirometry   - ambulation/PO hydration  - advance diet as tolerated   - simethicone  - SCDs/lovenox for DVT prophylaxis   - Robitussin for cough   - routine postop care   - anticipate discharge today    Dr. Mckeon and OB attending to be made aware.

## 2022-12-06 NOTE — PROGRESS NOTE ADULT - SUBJECTIVE AND OBJECTIVE BOX
PGY1 note  Chief Complaint: Post  section    Patient seen and examined. Pain well controlled at this time. Patient complaining of mild cough. Denies fever, chills, nausea, vomiting, chest pain, shortness of breath, severe abdominal pain, heavy vaginal bleeding.     Patient is ambulating.   Passing flatus, Denies bowel movement.   Regular, tolerating PO  Voiding without difficulty, no dysuria     PAST MEDICAL & SURGICAL HISTORY:  Hypothyroid  No significant past surgical history    MEDICATIONS  (STANDING):  acetaminophen     Tablet .. 975 milliGRAM(s) Oral <User Schedule>  diphtheria/tetanus/pertussis (acellular) Vaccine (Adacel) 0.5 milliLiter(s) IntraMuscular once  enoxaparin Injectable 40 milliGRAM(s) SubCutaneous every 24 hours  guaifenesin/dextromethorphan Oral Liquid 10 milliLiter(s) Oral every 6 hours  ibuprofen  Tablet. 600 milliGRAM(s) Oral every 6 hours  lactated ringers. 1000 milliLiter(s) (125 mL/Hr) IV Continuous <Continuous>  lactated ringers. 500 milliLiter(s) (500 mL/Hr) IV Continuous <Continuous>  levothyroxine 50 MICROGram(s) Oral daily  oxytocin Infusion 333.333 milliUNIT(s)/Min (1000 mL/Hr) IV Continuous <Continuous>    MEDICATIONS  (PRN):  diphenhydrAMINE 25 milliGRAM(s) Oral every 6 hours PRN Pruritus  lanolin Ointment 1 Application(s) Topical every 6 hours PRN Sore Nipples  magnesium hydroxide Suspension 30 milliLiter(s) Oral two times a day PRN Constipation  oxyCODONE    IR 5 milliGRAM(s) Oral every 3 hours PRN Moderate to Severe Pain (4-10)  oxyCODONE    IR 5 milliGRAM(s) Oral once PRN Moderate to Severe Pain (4-10)  simethicone 80 milliGRAM(s) Chew every 4 hours PRN Gas      Physical Exam  Vital Signs Last 24 Hrs  T(F): 97.1 (06 Dec 2022 03:30), Max: 99.1 (05 Dec 2022 11:18)  HR: 76 (06 Dec 2022 03:30) (76 - 92)  BP: 94/55 (06 Dec 2022 03:30) (94/55 - 136/76)  RR: 18 (06 Dec 2022 03:30) (18 - 18)    Physical exam:  General - AAOx3, NAD  Heart - S1S2, RRR  Lungs - CTA BL  Abdomen:  - Soft, appropriately tender, mildly distended, BS+. Clean, dry, intact BARBARA in place over pfannenstiel skin incision.  - Fundus firm, appropriately tender, below the umbilicus  - No rebound or guarding  Pelvis/Vagina - Normal Lochia  Extremities - No calf tenderness, no swelling    Labs:                        9.0    13.56 )-----------( 286      ( 04 Dec 2022 07:44 )             26.5                         8.2    11.44 )-----------( 284      ( 03 Dec 2022 18:22 )             25.0     Antibody Screen: NEG (22 @ 06:41)      Antibody Screen: NEG (22 @ 06:41)

## 2022-12-07 LAB — SURGICAL PATHOLOGY STUDY: SIGNIFICANT CHANGE UP

## 2022-12-08 ENCOUNTER — APPOINTMENT (OUTPATIENT)
Dept: ANTEPARTUM | Facility: CLINIC | Age: 32
End: 2022-12-08

## 2022-12-12 ENCOUNTER — APPOINTMENT (OUTPATIENT)
Dept: ANTEPARTUM | Facility: CLINIC | Age: 32
End: 2022-12-12

## 2022-12-13 DIAGNOSIS — Z3A.36 36 WEEKS GESTATION OF PREGNANCY: ICD-10-CM

## 2022-12-13 DIAGNOSIS — Z79.890 HORMONE REPLACEMENT THERAPY: ICD-10-CM

## 2022-12-13 DIAGNOSIS — Z28.21 IMMUNIZATION NOT CARRIED OUT BECAUSE OF PATIENT REFUSAL: ICD-10-CM

## 2022-12-13 DIAGNOSIS — R05.9 COUGH, UNSPECIFIED: ICD-10-CM

## 2022-12-13 DIAGNOSIS — N85.8 OTHER SPECIFIED NONINFLAMMATORY DISORDERS OF UTERUS: ICD-10-CM

## 2022-12-13 DIAGNOSIS — O32.4XX0 MATERNAL CARE FOR HIGH HEAD AT TERM, NOT APPLICABLE OR UNSPECIFIED: ICD-10-CM

## 2022-12-13 DIAGNOSIS — O41.1230 CHORIOAMNIONITIS, THIRD TRIMESTER, NOT APPLICABLE OR UNSPECIFIED: ICD-10-CM

## 2022-12-13 DIAGNOSIS — O36.8930 MATERNAL CARE FOR OTHER SPECIFIED FETAL PROBLEMS, THIRD TRIMESTER, NOT APPLICABLE OR UNSPECIFIED: ICD-10-CM

## 2022-12-13 DIAGNOSIS — E03.9 HYPOTHYROIDISM, UNSPECIFIED: ICD-10-CM

## 2022-12-13 DIAGNOSIS — Z28.09 IMMUNIZATION NOT CARRIED OUT BECAUSE OF OTHER CONTRAINDICATION: ICD-10-CM

## 2022-12-14 ENCOUNTER — EMERGENCY (EMERGENCY)
Facility: HOSPITAL | Age: 32
LOS: 0 days | Discharge: HOME | End: 2022-12-14
Attending: EMERGENCY MEDICINE | Admitting: EMERGENCY MEDICINE

## 2022-12-14 VITALS
DIASTOLIC BLOOD PRESSURE: 81 MMHG | RESPIRATION RATE: 16 BRPM | HEART RATE: 87 BPM | OXYGEN SATURATION: 100 % | SYSTOLIC BLOOD PRESSURE: 136 MMHG

## 2022-12-14 VITALS
HEART RATE: 106 BPM | TEMPERATURE: 97 F | OXYGEN SATURATION: 97 % | WEIGHT: 201.94 LBS | SYSTOLIC BLOOD PRESSURE: 125 MMHG | DIASTOLIC BLOOD PRESSURE: 73 MMHG | RESPIRATION RATE: 18 BRPM | HEIGHT: 60 IN

## 2022-12-14 DIAGNOSIS — R05.9 COUGH, UNSPECIFIED: ICD-10-CM

## 2022-12-14 DIAGNOSIS — G89.18 OTHER ACUTE POSTPROCEDURAL PAIN: ICD-10-CM

## 2022-12-14 DIAGNOSIS — E03.9 HYPOTHYROIDISM, UNSPECIFIED: ICD-10-CM

## 2022-12-14 LAB
ANION GAP SERPL CALC-SCNC: 11 MMOL/L — SIGNIFICANT CHANGE UP (ref 7–14)
BASOPHILS # BLD AUTO: 0.04 K/UL — SIGNIFICANT CHANGE UP (ref 0–0.2)
BASOPHILS NFR BLD AUTO: 0.4 % — SIGNIFICANT CHANGE UP (ref 0–1)
BUN SERPL-MCNC: 14 MG/DL — SIGNIFICANT CHANGE UP (ref 10–20)
CALCIUM SERPL-MCNC: 8.7 MG/DL — SIGNIFICANT CHANGE UP (ref 8.4–10.5)
CHLORIDE SERPL-SCNC: 103 MMOL/L — SIGNIFICANT CHANGE UP (ref 98–110)
CO2 SERPL-SCNC: 24 MMOL/L — SIGNIFICANT CHANGE UP (ref 17–32)
CREAT SERPL-MCNC: 0.6 MG/DL — LOW (ref 0.7–1.5)
EGFR: 121 ML/MIN/1.73M2 — SIGNIFICANT CHANGE UP
EOSINOPHIL # BLD AUTO: 0.19 K/UL — SIGNIFICANT CHANGE UP (ref 0–0.7)
EOSINOPHIL NFR BLD AUTO: 2.1 % — SIGNIFICANT CHANGE UP (ref 0–8)
GLUCOSE SERPL-MCNC: 96 MG/DL — SIGNIFICANT CHANGE UP (ref 70–99)
HCT VFR BLD CALC: 37.3 % — SIGNIFICANT CHANGE UP (ref 37–47)
HGB BLD-MCNC: 11.6 G/DL — LOW (ref 12–16)
IMM GRANULOCYTES NFR BLD AUTO: 1.9 % — HIGH (ref 0.1–0.3)
LYMPHOCYTES # BLD AUTO: 3.24 K/UL — SIGNIFICANT CHANGE UP (ref 1.2–3.4)
LYMPHOCYTES # BLD AUTO: 35.4 % — SIGNIFICANT CHANGE UP (ref 20.5–51.1)
MCHC RBC-ENTMCNC: 28.6 PG — SIGNIFICANT CHANGE UP (ref 27–31)
MCHC RBC-ENTMCNC: 31.1 G/DL — LOW (ref 32–37)
MCV RBC AUTO: 91.9 FL — SIGNIFICANT CHANGE UP (ref 81–99)
MONOCYTES # BLD AUTO: 0.54 K/UL — SIGNIFICANT CHANGE UP (ref 0.1–0.6)
MONOCYTES NFR BLD AUTO: 5.9 % — SIGNIFICANT CHANGE UP (ref 1.7–9.3)
NEUTROPHILS # BLD AUTO: 4.96 K/UL — SIGNIFICANT CHANGE UP (ref 1.4–6.5)
NEUTROPHILS NFR BLD AUTO: 54.3 % — SIGNIFICANT CHANGE UP (ref 42.2–75.2)
NRBC # BLD: 0 /100 WBCS — SIGNIFICANT CHANGE UP (ref 0–0)
PLATELET # BLD AUTO: 655 K/UL — HIGH (ref 130–400)
POTASSIUM SERPL-MCNC: 4.8 MMOL/L — SIGNIFICANT CHANGE UP (ref 3.5–5)
POTASSIUM SERPL-SCNC: 4.8 MMOL/L — SIGNIFICANT CHANGE UP (ref 3.5–5)
RBC # BLD: 4.06 M/UL — LOW (ref 4.2–5.4)
RBC # FLD: 13 % — SIGNIFICANT CHANGE UP (ref 11.5–14.5)
SODIUM SERPL-SCNC: 138 MMOL/L — SIGNIFICANT CHANGE UP (ref 135–146)
WBC # BLD: 9.14 K/UL — SIGNIFICANT CHANGE UP (ref 4.8–10.8)
WBC # FLD AUTO: 9.14 K/UL — SIGNIFICANT CHANGE UP (ref 4.8–10.8)

## 2022-12-14 PROCEDURE — 99282 EMERGENCY DEPT VISIT SF MDM: CPT

## 2022-12-14 PROCEDURE — 99284 EMERGENCY DEPT VISIT MOD MDM: CPT

## 2022-12-14 NOTE — CONSULT NOTE ADULT - ATTENDING COMMENTS
33 y/o POD # 11 s/p C/S, here with incisional pain and persistent upper respiratory symptoms. Incision healing well. Recommend management for cough per ED. Patient is not breastfeeding. Follow up outpatient.

## 2022-12-14 NOTE — ED ADULT NURSE NOTE - OBJECTIVE STATEMENT
Patient delivered baby via  11 days ago.  She is here today c/o discharge from incision site.  The site is also painful especially when she is coughing.  Denies fevers, denies nausea/vomiting.

## 2022-12-14 NOTE — CONSULT NOTE ADULT - SUBJECTIVE AND OBJECTIVE BOX
OBYGN PGY3    Chief Complaint: incisional redness    HPI:   33yo P3 s/p LTCS at 37w on 12/3/22 for arrest of descent after IOL for GDMA2 and treated for chorioamnionitis with amp/gent/clinda x24hr postpartum. Reports persistent cough for past few weeks starting a few days prior to delivery. Has tested herself for COVID at home with negative results. Denies sick contacts. Denies fever, chills, nausea, vomiting. Reports that while she coughs she has incisional burning and noticed mild redness on incision. Had postop check with GYN at [facility] which was normal per patient. Has tried cough suppressant without relief. She denies chest pain, SOB, palpitations, unilateral LE swelling. Denies dysuria, hematuria, increased frequency or urgency. Continues to do fasting glucose finger sticks at home which are in the 90s. Denies vaginal bleeding or abnormal vaginal discharge. Denies abdominal pain.     Ob/Gyn History:                     Denies history of ovarian cysts, uterine fibroids, abnormal paps, or STIs    Denies the following: constitutional symptoms, visual symptoms, cardiovascular symptoms, respiratory symptoms, GI symptoms, musculoskeletal symptoms, skin symptoms, neurologic symptoms, hematologic symptoms, allergic symptoms, psychiatric symptoms  Except any pertinent positives listed.     Home Medications:  levothyroxine 50 mcg (0.05 mg) oral tablet: 1 tab(s) orally once a day (05 Dec 2022 05:12)      Allergies    No Known Allergies    Intolerances        PAST MEDICAL & SURGICAL HISTORY:  Hypothyroid      No significant past surgical history          FAMILY HISTORY:  No pertinent family history in first degree relatives        SOCIAL HISTORY: Denies cigarette use, alcohol use, or illicit drug use    Vital Signs Last 24 Hrs  T(F): 96.8 (14 Dec 2022 10:20), Max: 96.8 (14 Dec 2022 10:20)  HR: 106 (14 Dec 2022 10:20) (106 - 106)  BP: 125/73 (14 Dec 2022 10:20) (125/73 - 125/73)  RR: 18 (14 Dec 2022 10:20) (18 - 18)    General Appearance - AAOx3, NAD  Heart - S1S2 regular rate and rhythm  Lung - CTA Bilaterally  Abdomen - Soft, nontender, nondistended, no rebound, no rigidity, no guarding, bowel sounds present  Wound: Pfannenstiel incision appears clean, dry, intact, no erythema or drainage   Fundus: no fundal tenderness  Ext: no LE edema or tenderness    GYN/Pelvis: deferred    LABS:                        11.6   9.14  )-----------( 655      ( 14 Dec 2022 11:41 )             37.3

## 2022-12-14 NOTE — ED PROVIDER NOTE - OBJECTIVE STATEMENT
Pt with C section 11 days ago. Has pain at site with scant drainage. No efevr, chills. Pain is worse with a cough that has been present for over 2 weeks

## 2022-12-14 NOTE — ED PROVIDER NOTE - CLINICAL SUMMARY MEDICAL DECISION MAKING FREE TEXT BOX
32-year-old female presents ED for cough and pain around  scar drainage.  Wound is clean dry and intact healing well.  Labs within normal limits GYN was consulted and they believe that surgical site looks good no signs of infection.  DC home with strict return precautions.

## 2022-12-14 NOTE — ED ADULT NURSE NOTE - CHIEF COMPLAINT QUOTE
pt c/o coughing, states she had a  done 11 days ago. pt c/o discharge to her wound whenever she coughs

## 2022-12-14 NOTE — ED PROVIDER NOTE - PHYSICAL EXAMINATION
CONST: Well appearing in NAD  CARD: Normal S1 S2; Normal rate and rhythm  RESP: Equal BS B/L, No wheezes, rhonchi or rales. No distress  GI: Soft, non-tender, non-distended. surgical site C section is clean and intact with no redness, drainage or induration  MS: Normal ROM in all extremities. No midline spinal tenderness.  SKIN: Warm, dry, no acute rashes. Good turgor  NEURO: A&Ox3, No focal deficits. Strength 5/5 with no sensory deficits. Steady gait

## 2022-12-14 NOTE — ED PROVIDER NOTE - PATIENT PORTAL LINK FT
You can access the FollowMyHealth Patient Portal offered by [facility] [place] by registering at the following website: [email or web address]. By joining [employer]’s FollowMyHealth portal, you will also be able to view your health information using other applications (apps) compatible with our system.

## 2022-12-14 NOTE — ED ADULT TRIAGE NOTE - CHIEF COMPLAINT QUOTE
pt c/o coughing, states she had a  done 11 days ago. pt c/o discharge to her wound whenever she coughs
Imaging Studies/Medications

## 2022-12-15 ENCOUNTER — APPOINTMENT (OUTPATIENT)
Dept: ANTEPARTUM | Facility: CLINIC | Age: 32
End: 2022-12-15

## 2022-12-19 ENCOUNTER — APPOINTMENT (OUTPATIENT)
Dept: ANTEPARTUM | Facility: CLINIC | Age: 32
End: 2022-12-19

## 2022-12-22 ENCOUNTER — APPOINTMENT (OUTPATIENT)
Dept: ANTEPARTUM | Facility: CLINIC | Age: 32
End: 2022-12-22

## 2023-01-27 LAB
BILIRUB UR QL STRIP: NORMAL
CLARITY UR: CLEAR
COLLECTION METHOD: NORMAL
GLUCOSE BLDC GLUCOMTR-MCNC: 117
GLUCOSE UR-MCNC: NORMAL
HCG UR QL: 0.2 EU/DL
HGB UR QL STRIP.AUTO: NORMAL
KETONES UR-MCNC: NORMAL
LEUKOCYTE ESTERASE UR QL STRIP: NORMAL
NITRITE UR QL STRIP: NORMAL
PH UR STRIP: 6
PROT UR STRIP-MCNC: NORMAL
SP GR UR STRIP: 1.01

## 2023-04-03 ENCOUNTER — EMERGENCY (EMERGENCY)
Facility: HOSPITAL | Age: 33
LOS: 0 days | End: 2023-04-03
Payer: MEDICAID

## 2023-04-03 VITALS
RESPIRATION RATE: 18 BRPM | DIASTOLIC BLOOD PRESSURE: 76 MMHG | WEIGHT: 203.93 LBS | TEMPERATURE: 97 F | OXYGEN SATURATION: 100 % | HEART RATE: 84 BPM | SYSTOLIC BLOOD PRESSURE: 119 MMHG

## 2023-04-03 DIAGNOSIS — Z53.21 PROCEDURE AND TREATMENT NOT CARRIED OUT DUE TO PATIENT LEAVING PRIOR TO BEING SEEN BY HEALTH CARE PROVIDER: ICD-10-CM

## 2023-04-03 PROCEDURE — L9991: CPT

## 2023-04-03 PROCEDURE — 99281 EMR DPT VST MAYX REQ PHY/QHP: CPT

## 2023-04-03 NOTE — ED ADULT TRIAGE NOTE - CHIEF COMPLAINT QUOTE
Patient complaining of high blood pressure x5 days- states she does not take anything for high blood pressure

## 2023-09-30 ENCOUNTER — EMERGENCY (EMERGENCY)
Facility: HOSPITAL | Age: 33
LOS: 0 days | Discharge: HOME | End: 2023-09-30
Attending: STUDENT IN AN ORGANIZED HEALTH CARE EDUCATION/TRAINING PROGRAM
Payer: MEDICAID

## 2023-09-30 VITALS
SYSTOLIC BLOOD PRESSURE: 109 MMHG | DIASTOLIC BLOOD PRESSURE: 67 MMHG | HEART RATE: 98 BPM | WEIGHT: 210.1 LBS | RESPIRATION RATE: 18 BRPM | OXYGEN SATURATION: 99 % | TEMPERATURE: 98 F

## 2023-09-30 DIAGNOSIS — E03.9 HYPOTHYROIDISM, UNSPECIFIED: ICD-10-CM

## 2023-09-30 DIAGNOSIS — K59.00 CONSTIPATION, UNSPECIFIED: ICD-10-CM

## 2023-09-30 DIAGNOSIS — R35.0 FREQUENCY OF MICTURITION: ICD-10-CM

## 2023-09-30 DIAGNOSIS — R10.30 LOWER ABDOMINAL PAIN, UNSPECIFIED: ICD-10-CM

## 2023-09-30 DIAGNOSIS — E78.5 HYPERLIPIDEMIA, UNSPECIFIED: ICD-10-CM

## 2023-09-30 DIAGNOSIS — R39.15 URGENCY OF URINATION: ICD-10-CM

## 2023-09-30 LAB
ALBUMIN SERPL ELPH-MCNC: 4.4 G/DL — SIGNIFICANT CHANGE UP (ref 3.5–5.2)
ALP SERPL-CCNC: 88 U/L — SIGNIFICANT CHANGE UP (ref 30–115)
ALT FLD-CCNC: 16 U/L — SIGNIFICANT CHANGE UP (ref 0–41)
ANION GAP SERPL CALC-SCNC: 8 MMOL/L — SIGNIFICANT CHANGE UP (ref 7–14)
APPEARANCE UR: CLEAR — SIGNIFICANT CHANGE UP
AST SERPL-CCNC: 16 U/L — SIGNIFICANT CHANGE UP (ref 0–41)
BASOPHILS # BLD AUTO: 0.04 K/UL — SIGNIFICANT CHANGE UP (ref 0–0.2)
BASOPHILS NFR BLD AUTO: 0.4 % — SIGNIFICANT CHANGE UP (ref 0–1)
BILIRUB SERPL-MCNC: 0.3 MG/DL — SIGNIFICANT CHANGE UP (ref 0.2–1.2)
BILIRUB UR-MCNC: NEGATIVE — SIGNIFICANT CHANGE UP
BUN SERPL-MCNC: 15 MG/DL — SIGNIFICANT CHANGE UP (ref 10–20)
CALCIUM SERPL-MCNC: 8.8 MG/DL — SIGNIFICANT CHANGE UP (ref 8.4–10.5)
CHLORIDE SERPL-SCNC: 104 MMOL/L — SIGNIFICANT CHANGE UP (ref 98–110)
CO2 SERPL-SCNC: 26 MMOL/L — SIGNIFICANT CHANGE UP (ref 17–32)
COLOR SPEC: YELLOW — SIGNIFICANT CHANGE UP
CREAT SERPL-MCNC: 0.9 MG/DL — SIGNIFICANT CHANGE UP (ref 0.7–1.5)
DIFF PNL FLD: NEGATIVE — SIGNIFICANT CHANGE UP
EGFR: 86 ML/MIN/1.73M2 — SIGNIFICANT CHANGE UP
EOSINOPHIL # BLD AUTO: 0.25 K/UL — SIGNIFICANT CHANGE UP (ref 0–0.7)
EOSINOPHIL NFR BLD AUTO: 2.3 % — SIGNIFICANT CHANGE UP (ref 0–8)
GLUCOSE SERPL-MCNC: 102 MG/DL — HIGH (ref 70–99)
GLUCOSE UR QL: NEGATIVE MG/DL — SIGNIFICANT CHANGE UP
HCG SERPL QL: NEGATIVE — SIGNIFICANT CHANGE UP
HCT VFR BLD CALC: 38 % — SIGNIFICANT CHANGE UP (ref 37–47)
HGB BLD-MCNC: 12.5 G/DL — SIGNIFICANT CHANGE UP (ref 12–16)
IMM GRANULOCYTES NFR BLD AUTO: 0.3 % — SIGNIFICANT CHANGE UP (ref 0.1–0.3)
KETONES UR-MCNC: NEGATIVE MG/DL — SIGNIFICANT CHANGE UP
LEUKOCYTE ESTERASE UR-ACNC: NEGATIVE — SIGNIFICANT CHANGE UP
LIDOCAIN IGE QN: 28 U/L — SIGNIFICANT CHANGE UP (ref 7–60)
LYMPHOCYTES # BLD AUTO: 3.55 K/UL — HIGH (ref 1.2–3.4)
LYMPHOCYTES # BLD AUTO: 33.1 % — SIGNIFICANT CHANGE UP (ref 20.5–51.1)
MCHC RBC-ENTMCNC: 28.7 PG — SIGNIFICANT CHANGE UP (ref 27–31)
MCHC RBC-ENTMCNC: 32.9 G/DL — SIGNIFICANT CHANGE UP (ref 32–37)
MCV RBC AUTO: 87.4 FL — SIGNIFICANT CHANGE UP (ref 81–99)
MONOCYTES # BLD AUTO: 0.51 K/UL — SIGNIFICANT CHANGE UP (ref 0.1–0.6)
MONOCYTES NFR BLD AUTO: 4.8 % — SIGNIFICANT CHANGE UP (ref 1.7–9.3)
NEUTROPHILS # BLD AUTO: 6.33 K/UL — SIGNIFICANT CHANGE UP (ref 1.4–6.5)
NEUTROPHILS NFR BLD AUTO: 59.1 % — SIGNIFICANT CHANGE UP (ref 42.2–75.2)
NITRITE UR-MCNC: NEGATIVE — SIGNIFICANT CHANGE UP
NRBC # BLD: 0 /100 WBCS — SIGNIFICANT CHANGE UP (ref 0–0)
PH UR: 7 — SIGNIFICANT CHANGE UP (ref 5–8)
PLATELET # BLD AUTO: 322 K/UL — SIGNIFICANT CHANGE UP (ref 130–400)
PMV BLD: 9.1 FL — SIGNIFICANT CHANGE UP (ref 7.4–10.4)
POTASSIUM SERPL-MCNC: 4.4 MMOL/L — SIGNIFICANT CHANGE UP (ref 3.5–5)
POTASSIUM SERPL-SCNC: 4.4 MMOL/L — SIGNIFICANT CHANGE UP (ref 3.5–5)
PROT SERPL-MCNC: 7 G/DL — SIGNIFICANT CHANGE UP (ref 6–8)
PROT UR-MCNC: NEGATIVE MG/DL — SIGNIFICANT CHANGE UP
RBC # BLD: 4.35 M/UL — SIGNIFICANT CHANGE UP (ref 4.2–5.4)
RBC # FLD: 11.7 % — SIGNIFICANT CHANGE UP (ref 11.5–14.5)
SODIUM SERPL-SCNC: 138 MMOL/L — SIGNIFICANT CHANGE UP (ref 135–146)
SP GR SPEC: 1.03 — SIGNIFICANT CHANGE UP (ref 1–1.03)
UROBILINOGEN FLD QL: 1 MG/DL — SIGNIFICANT CHANGE UP (ref 0.2–1)
WBC # BLD: 10.71 K/UL — SIGNIFICANT CHANGE UP (ref 4.8–10.8)
WBC # FLD AUTO: 10.71 K/UL — SIGNIFICANT CHANGE UP (ref 4.8–10.8)

## 2023-09-30 PROCEDURE — 81003 URINALYSIS AUTO W/O SCOPE: CPT

## 2023-09-30 PROCEDURE — 74177 CT ABD & PELVIS W/CONTRAST: CPT | Mod: MA

## 2023-09-30 PROCEDURE — 83690 ASSAY OF LIPASE: CPT

## 2023-09-30 PROCEDURE — 84703 CHORIONIC GONADOTROPIN ASSAY: CPT

## 2023-09-30 PROCEDURE — 85025 COMPLETE CBC W/AUTO DIFF WBC: CPT

## 2023-09-30 PROCEDURE — 36415 COLL VENOUS BLD VENIPUNCTURE: CPT

## 2023-09-30 PROCEDURE — 99285 EMERGENCY DEPT VISIT HI MDM: CPT

## 2023-09-30 PROCEDURE — 74177 CT ABD & PELVIS W/CONTRAST: CPT | Mod: 26,MA

## 2023-09-30 PROCEDURE — 99284 EMERGENCY DEPT VISIT MOD MDM: CPT | Mod: 25

## 2023-09-30 PROCEDURE — 80053 COMPREHEN METABOLIC PANEL: CPT

## 2023-09-30 PROCEDURE — 87086 URINE CULTURE/COLONY COUNT: CPT

## 2023-09-30 RX ORDER — NYSTATIN CREAM 100000 [USP'U]/G
1 CREAM TOPICAL
Qty: 1 | Refills: 0
Start: 2023-09-30 | End: 2023-10-06

## 2023-09-30 RX ORDER — KETOROLAC TROMETHAMINE 30 MG/ML
30 SYRINGE (ML) INJECTION ONCE
Refills: 0 | Status: COMPLETED | OUTPATIENT
Start: 2023-09-30 | End: 2023-09-30

## 2023-09-30 RX ORDER — SODIUM CHLORIDE 9 MG/ML
1000 INJECTION INTRAMUSCULAR; INTRAVENOUS; SUBCUTANEOUS ONCE
Refills: 0 | Status: COMPLETED | OUTPATIENT
Start: 2023-09-30 | End: 2023-09-30

## 2023-09-30 RX ADMIN — SODIUM CHLORIDE 1000 MILLILITER(S): 9 INJECTION INTRAMUSCULAR; INTRAVENOUS; SUBCUTANEOUS at 15:11

## 2023-09-30 NOTE — ED PROVIDER NOTE - ADDITIONAL NOTES AND INSTRUCTIONS:
Ms Brandt was seen in the Emergency Department on 9/30/23 and can return to school or work by the listed date with activity as tolerated.

## 2023-09-30 NOTE — ED PROVIDER NOTE - PHYSICAL EXAMINATION
As Follows:  CONST: Well appearing in NAD  EYES: PERRL, EOMI, Sclera and conjunctiva clear.   ENT: No nasal discharge. Oropharynx normal appearing, no erythema or exudates. Uvula midline  CARD: No murmurs, rubs, or gallops; Normal rate and rhythm  RESP: BS Equal B/L, No wheezes, rhonchi or rales. No distress or accessory breathing  GI: Mild lower abdominal discomfort. Soft, non-distended. CVA tenderness.   SKIN: Warm, dry, no acute rashes. MMM  NEURO: A&Ox3, No focal deficits. Strength and sensation intact. Steady gait

## 2023-09-30 NOTE — ED ADULT NURSE NOTE - NSFALLUNIVINTERV_ED_ALL_ED
Bed/Stretcher in lowest position, wheels locked, appropriate side rails in place/Call bell, personal items and telephone in reach/Instruct patient to call for assistance before getting out of bed/chair/stretcher/Non-slip footwear applied when patient is off stretcher/[place] to call system/Physically safe environment - no spills, clutter or unnecessary equipment/Purposeful proactive rounding/Room/bathroom lighting operational, light cord in reach

## 2023-09-30 NOTE — ED PROVIDER NOTE - OBJECTIVE STATEMENT
Patient is a 33 year old female with pmhx of hypothyroidism, hyperlipidemia presents for evaluation of lower abdominal discomfort x 15 days patient states is along the site of her last C section 10 months ago. Patient called her OB GYN and was prescribed Nystatin cream which was mildly alleviating. She also admits to some urgency/frequency without dysuria/ hematuria. She denies any fever, cough, sore throat, N/V, chest pain, sob.

## 2023-09-30 NOTE — ED PROVIDER NOTE - ATTENDING APP SHARED VISIT CONTRIBUTION OF CARE
34 yo f hx hld, hypothyroid, c/s x10 months  pt presents for eval of lower abd pain. pain around c/s site. no dehiscence of c/s scar.  pain radiates to lower back.  pt endorsed some urinary urgency/frequency.  pain began 15 days ago. pt called her gyn who rx nystatin cream for abd wall. 34 yo f hx hld, hypothyroid, c/s x10 months  pt presents for eval of lower abd pain. pain around c/s site. no dehiscence of c/s scar.  pain radiates to lower back.  pt endorsed some urinary urgency/frequency.  no vaginal bleeding or discharge. pain began 15 days ago. pt called her gyn who rx nystatin cream for abd wall. 34 yo f hx hld, hypothyroid, c/s x10 months  pt presents for eval of lower abd pain. pain around c/s site. no dehiscence of c/s scar.  pain radiates to lower back.  pt endorsed some urinary urgency/frequency.  no vaginal bleeding or discharge. pain began 15 days ago. pt called her gyn who rx nystatin cream for abd wall. pt states redness improved but she still has some discomfort. pt states she is eating well but feels a bit constipated. last bm x2 days.    vss  gen- NAD, aaox3  card-rrr  lungs-ctab, no wheezing or rhonchi  abd-sntnd, no guarding or rebound, c/s scar well healed, no erythema to abd wall, no cvat   neuro- full str/sensation, cn ii-xii grossly intact, normal coordination

## 2023-09-30 NOTE — ED PROVIDER NOTE - CLINICAL SUMMARY MEDICAL DECISION MAKING FREE TEXT BOX
Serial abdominal exams throughout ED observation period benign.  Labs and imaging reassuring. No acute findings on imaging.  Advised pt to follow up closely   with PCP .  Return precautions discussed. Pt understands plan and had opportunity to ask questions.

## 2023-09-30 NOTE — ED PROVIDER NOTE - NSFOLLOWUPINSTRUCTIONS_ED_ALL_ED_FT
Follow up with your Primary Care provider.    Our Emergency Department Referral Coordinators will be reaching out to you in the next 24-48 hours from 9:00am to 5:00pm with a follow up appointment. Please expect a phone call from the hospital in that time frame. If you do not receive a call or if you have any questions or concerns, you can reach them at   [phone]    Abdominal Pain    Many things can cause abdominal pain. Usually, abdominal pain is not caused by a disease and will improve without treatment. Your health care provider will do a physical exam and possibly order blood tests and imaging to help determine the seriousness of your pain. However, in many cases, no cause may be found and you may need further testing as an outpatient. Monitor your abdominal pain for any changes.     SEEK IMMEDIATE MEDICAL CARE IF YOU HAVE THE FOLLOWING SYMPTOMS: worsening abdominal pain, vomiting, diarrhea, inability to have bowel movements or pass gas, black or bloody stool, fever accompanying chest pain or back pain, or dizziness/lightheadedness.

## 2023-09-30 NOTE — ED PROVIDER NOTE - PATIENT PORTAL LINK FT
You can access the FollowMyHealth Patient Portal offered by [facility] by registering at the following website: [email or web address]. By joining [employer]’s FollowMyHealth portal, you will also be able to view your health information using other applications (apps) compatible with our system.

## 2023-09-30 NOTE — ED ADULT NURSE NOTE - OBJECTIVE STATEMENT
pt presented to ed complaining of pelvic pain. states had a recent c section, and pain is similar to the time of c section. pt offered pain med, refused.  has been on pain meds for some time and does not want to take more than needed. pt appearance is calm, cooperative.

## 2023-10-02 LAB
CULTURE RESULTS: SIGNIFICANT CHANGE UP
SPECIMEN SOURCE: SIGNIFICANT CHANGE UP

## 2024-09-16 ENCOUNTER — APPOINTMENT (OUTPATIENT)
Dept: PEDIATRIC ALLERGY IMMUNOLOGY | Facility: CLINIC | Age: 34
End: 2024-09-16
Payer: MEDICAID

## 2024-09-16 VITALS
DIASTOLIC BLOOD PRESSURE: 80 MMHG | SYSTOLIC BLOOD PRESSURE: 125 MMHG | HEART RATE: 68 BPM | BODY MASS INDEX: 39.27 KG/M2 | HEIGHT: 60 IN | WEIGHT: 200 LBS

## 2024-09-16 DIAGNOSIS — T78.1XXA OTHER ADVERSE FOOD REACTIONS, NOT ELSEWHERE CLASSIFIED, INITIAL ENCOUNTER: ICD-10-CM

## 2024-09-16 DIAGNOSIS — J30.1 ALLERGIC RHINITIS DUE TO POLLEN: ICD-10-CM

## 2024-09-16 DIAGNOSIS — D89.89 OTHER SPECIFIED DISORDERS INVOLVING THE IMMUNE MECHANISM, NOT ELSEWHERE CLASSIFIED: ICD-10-CM

## 2024-09-16 DIAGNOSIS — L50.9 URTICARIA, UNSPECIFIED: ICD-10-CM

## 2024-09-16 DIAGNOSIS — H10.13 ACUTE ATOPIC CONJUNCTIVITIS, BILATERAL: ICD-10-CM

## 2024-09-16 DIAGNOSIS — E07.9 DISORDER OF THYROID, UNSPECIFIED: ICD-10-CM

## 2024-09-16 PROCEDURE — 99204 OFFICE O/P NEW MOD 45 MIN: CPT

## 2024-09-16 RX ORDER — FAMOTIDINE 20 MG/1
20 TABLET, FILM COATED ORAL
Qty: 60 | Refills: 2 | Status: ACTIVE | COMMUNITY
Start: 2024-09-16 | End: 1900-01-01

## 2024-09-16 RX ORDER — LEVOTHYROXINE SODIUM 0.07 MG/1
75 TABLET ORAL
Refills: 0 | Status: ACTIVE | COMMUNITY

## 2024-09-16 RX ORDER — CETIRIZINE HYDROCHLORIDE 10 MG/1
10 TABLET, COATED ORAL DAILY
Qty: 30 | Refills: 2 | Status: ACTIVE | COMMUNITY
Start: 2024-09-16 | End: 1900-01-01

## 2024-09-16 NOTE — HISTORY OF PRESENT ILLNESS
[None] : The patient is currently asymptomatic [de-identified] : GERMANIA VELÁSQUEZ is a 33 yo female who states she gets a rash every time she has soy products. She states she also gets a rash every few days. Very itchy and all over her body. She also gets itching inside her ears. She states she cannot pinpoint any other foods specifically but gets it after she eats. She states she also gets rash and shortness of breath in the cold. She also has red, itchy eyes, itchy nose, itchy throat, congestion, runny nose and sneezing with the changes of seasons for the past 15-16 years. She explained to her PCP that her symptoms have gradually gotten worse and they suggested allergy workup. This has been going on since she was 3 years old and she has reactions that last for a few hours every 1-2 days.

## 2024-09-16 NOTE — REVIEW OF SYSTEMS
[Eye Redness] : redness [Eye Itching] : itchy eyes [Rhinorrhea] : rhinorrhea [Nasal Congestion] : nasal congestion [Snoring] : snoring [Sneezing] : sneezing [SOB at Rest] : shortness of breath at rest [Urticaria] : urticaria [Pruritus] : pruritus [Nl] : Genitourinary

## 2024-09-16 NOTE — ASSESSMENT
[FreeTextEntry1] : 1. Urticaria - will do work up - cetirizine 10mg, famotidine 20mg,   2. AR/AC - immunocap to environmental allergens  3. Adverse food reaction- avoid Soy - immuncap to soy

## 2024-10-04 ENCOUNTER — LABORATORY RESULT (OUTPATIENT)
Age: 34
End: 2024-10-04

## 2025-06-07 NOTE — ED ADULT NURSE REASSESSMENT NOTE - NS ED NURSE REASSESS COMMENT FT1
Patient refusing to be seen by MD because she will not be in the same area as her mother who is a patient here. Patient left and removed from board. contact guard